# Patient Record
Sex: FEMALE | Race: WHITE | NOT HISPANIC OR LATINO | Employment: OTHER | URBAN - METROPOLITAN AREA
[De-identification: names, ages, dates, MRNs, and addresses within clinical notes are randomized per-mention and may not be internally consistent; named-entity substitution may affect disease eponyms.]

---

## 2018-01-14 ENCOUNTER — HOSPITAL ENCOUNTER (INPATIENT)
Facility: HOSPITAL | Age: 83
LOS: 4 days | Discharge: NURSING, REHAB OR LONG-TERM CARE | DRG: 470 | End: 2018-01-18
Attending: EMERGENCY MEDICINE | Admitting: FAMILY MEDICINE
Payer: MEDICARE

## 2018-01-14 ENCOUNTER — APPOINTMENT (EMERGENCY)
Dept: RADIOLOGY | Facility: HOSPITAL | Age: 83
DRG: 470 | End: 2018-01-14
Payer: MEDICARE

## 2018-01-14 ENCOUNTER — APPOINTMENT (EMERGENCY)
Dept: CT IMAGING | Facility: HOSPITAL | Age: 83
DRG: 470 | End: 2018-01-14
Payer: MEDICARE

## 2018-01-14 DIAGNOSIS — S51.001A AVULSION OF SKIN OF RIGHT ELBOW, INITIAL ENCOUNTER: ICD-10-CM
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
## 2018-01-14 DIAGNOSIS — S72.001A CLOSED DISPLACED FRACTURE OF RIGHT FEMORAL NECK (HCC): ICD-10-CM

## 2018-01-14 PROBLEM — R26.2 AMBULATORY DYSFUNCTION: Chronic | Status: ACTIVE | Noted: 2018-01-14

## 2018-01-14 LAB
ABO GROUP BLD: NORMAL
ANION GAP SERPL CALCULATED.3IONS-SCNC: 10 MMOL/L (ref 4–13)
BASOPHILS # BLD AUTO: 0.02 THOUSANDS/ΜL (ref 0–0.1)
BASOPHILS NFR BLD AUTO: 0 % (ref 0–1)
BLD GP AB SCN SERPL QL: NEGATIVE
BUN SERPL-MCNC: 20 MG/DL (ref 5–25)
CALCIUM SERPL-MCNC: 8.8 MG/DL (ref 8.3–10.1)
CHLORIDE SERPL-SCNC: 100 MMOL/L (ref 100–108)
CO2 SERPL-SCNC: 27 MMOL/L (ref 21–32)
CREAT SERPL-MCNC: 1.01 MG/DL (ref 0.6–1.3)
EOSINOPHIL # BLD AUTO: 0.16 THOUSAND/ΜL (ref 0–0.61)
EOSINOPHIL NFR BLD AUTO: 1 % (ref 0–6)
ERYTHROCYTE [DISTWIDTH] IN BLOOD BY AUTOMATED COUNT: 15.4 % (ref 11.6–15.1)
GFR SERPL CREATININE-BSD FRML MDRD: 47 ML/MIN/1.73SQ M
GLUCOSE SERPL-MCNC: 96 MG/DL (ref 65–140)
HCT VFR BLD AUTO: 36 % (ref 34.8–46.1)
HGB BLD-MCNC: 11.3 G/DL (ref 11.5–15.4)
INR PPP: 1.04 (ref 0.86–1.16)
LYMPHOCYTES # BLD AUTO: 1.51 THOUSANDS/ΜL (ref 0.6–4.47)
LYMPHOCYTES NFR BLD AUTO: 8 % (ref 14–44)
MCH RBC QN AUTO: 25.9 PG (ref 26.8–34.3)
MCHC RBC AUTO-ENTMCNC: 31.4 G/DL (ref 31.4–37.4)
MCV RBC AUTO: 82 FL (ref 82–98)
MONOCYTES # BLD AUTO: 2.02 THOUSAND/ΜL (ref 0.17–1.22)
MONOCYTES NFR BLD AUTO: 11 % (ref 4–12)
NEUTROPHILS # BLD AUTO: 14.96 THOUSANDS/ΜL (ref 1.85–7.62)
NEUTS SEG NFR BLD AUTO: 80 % (ref 43–75)
PLATELET # BLD AUTO: 381 THOUSANDS/UL (ref 149–390)
PMV BLD AUTO: 10 FL (ref 8.9–12.7)
POTASSIUM SERPL-SCNC: 4.1 MMOL/L (ref 3.5–5.3)
PROTHROMBIN TIME: 13.9 SECONDS (ref 12.1–14.4)
RBC # BLD AUTO: 4.37 MILLION/UL (ref 3.81–5.12)
RH BLD: POSITIVE
SODIUM SERPL-SCNC: 137 MMOL/L (ref 136–145)
SPECIMEN EXPIRATION DATE: NORMAL
WBC # BLD AUTO: 18.67 THOUSAND/UL (ref 4.31–10.16)

## 2018-01-14 PROCEDURE — 86900 BLOOD TYPING SEROLOGIC ABO: CPT | Performed by: EMERGENCY MEDICINE

## 2018-01-14 PROCEDURE — 87081 CULTURE SCREEN ONLY: CPT | Performed by: PHYSICIAN ASSISTANT

## 2018-01-14 PROCEDURE — 80048 BASIC METABOLIC PNL TOTAL CA: CPT | Performed by: EMERGENCY MEDICINE

## 2018-01-14 PROCEDURE — 86901 BLOOD TYPING SEROLOGIC RH(D): CPT | Performed by: EMERGENCY MEDICINE

## 2018-01-14 PROCEDURE — 36415 COLL VENOUS BLD VENIPUNCTURE: CPT | Performed by: EMERGENCY MEDICINE

## 2018-01-14 PROCEDURE — 96374 THER/PROPH/DIAG INJ IV PUSH: CPT

## 2018-01-14 PROCEDURE — 86850 RBC ANTIBODY SCREEN: CPT | Performed by: EMERGENCY MEDICINE

## 2018-01-14 PROCEDURE — 86920 COMPATIBILITY TEST SPIN: CPT

## 2018-01-14 PROCEDURE — 70450 CT HEAD/BRAIN W/O DYE: CPT

## 2018-01-14 PROCEDURE — 93005 ELECTROCARDIOGRAM TRACING: CPT

## 2018-01-14 PROCEDURE — 85610 PROTHROMBIN TIME: CPT | Performed by: PHYSICIAN ASSISTANT

## 2018-01-14 PROCEDURE — 71045 X-RAY EXAM CHEST 1 VIEW: CPT

## 2018-01-14 PROCEDURE — 73502 X-RAY EXAM HIP UNI 2-3 VIEWS: CPT

## 2018-01-14 PROCEDURE — 93005 ELECTROCARDIOGRAM TRACING: CPT | Performed by: EMERGENCY MEDICINE

## 2018-01-14 PROCEDURE — 85025 COMPLETE CBC W/AUTO DIFF WBC: CPT | Performed by: EMERGENCY MEDICINE

## 2018-01-14 PROCEDURE — 72125 CT NECK SPINE W/O DYE: CPT

## 2018-01-14 RX ORDER — CITALOPRAM 20 MG/1
20 TABLET ORAL DAILY
COMMUNITY

## 2018-01-14 RX ORDER — LIDOCAINE 50 MG/G
1 PATCH TOPICAL DAILY
Status: COMPLETED | OUTPATIENT
Start: 2018-01-15 | End: 2018-01-15

## 2018-01-14 RX ORDER — POLYVINYL ALCOHOL 14 MG/ML
1 SOLUTION/ DROPS OPHTHALMIC 3 TIMES DAILY
COMMUNITY

## 2018-01-14 RX ORDER — GINSENG 100 MG
1 CAPSULE ORAL ONCE
Status: COMPLETED | OUTPATIENT
Start: 2018-01-14 | End: 2018-01-14

## 2018-01-14 RX ORDER — LANOLIN ALCOHOL/MO/W.PET/CERES
6 CREAM (GRAM) TOPICAL
Status: DISCONTINUED | OUTPATIENT
Start: 2018-01-14 | End: 2018-01-17

## 2018-01-14 RX ORDER — SUCRALFATE 1 G/1
1 TABLET ORAL 4 TIMES DAILY
Status: DISCONTINUED | OUTPATIENT
Start: 2018-01-14 | End: 2018-01-18 | Stop reason: HOSPADM

## 2018-01-14 RX ORDER — ONDANSETRON 2 MG/ML
4 INJECTION INTRAMUSCULAR; INTRAVENOUS EVERY 6 HOURS PRN
Status: DISCONTINUED | OUTPATIENT
Start: 2018-01-14 | End: 2018-01-18 | Stop reason: HOSPADM

## 2018-01-14 RX ORDER — SUCRALFATE 1 G/1
1 TABLET ORAL 4 TIMES DAILY
COMMUNITY

## 2018-01-14 RX ORDER — OXYCODONE HYDROCHLORIDE 5 MG/1
5 TABLET ORAL EVERY 4 HOURS PRN
Status: DISCONTINUED | OUTPATIENT
Start: 2018-01-14 | End: 2018-01-17

## 2018-01-14 RX ORDER — POLYVINYL ALCOHOL 14 MG/ML
1 SOLUTION/ DROPS OPHTHALMIC 3 TIMES DAILY
Status: DISCONTINUED | OUTPATIENT
Start: 2018-01-14 | End: 2018-01-18 | Stop reason: HOSPADM

## 2018-01-14 RX ORDER — CITALOPRAM 20 MG/1
20 TABLET ORAL DAILY
Status: DISCONTINUED | OUTPATIENT
Start: 2018-01-15 | End: 2018-01-18 | Stop reason: HOSPADM

## 2018-01-14 RX ORDER — MORPHINE SULFATE 4 MG/ML
4 INJECTION, SOLUTION INTRAMUSCULAR; INTRAVENOUS ONCE
Status: COMPLETED | OUTPATIENT
Start: 2018-01-14 | End: 2018-01-14

## 2018-01-14 RX ORDER — MAGNESIUM HYDROXIDE/ALUMINUM HYDROXICE/SIMETHICONE 120; 1200; 1200 MG/30ML; MG/30ML; MG/30ML
30 SUSPENSION ORAL EVERY 6 HOURS PRN
Status: DISCONTINUED | OUTPATIENT
Start: 2018-01-14 | End: 2018-01-18 | Stop reason: HOSPADM

## 2018-01-14 RX ORDER — GABAPENTIN 100 MG/1
100 CAPSULE ORAL
Status: DISCONTINUED | OUTPATIENT
Start: 2018-01-14 | End: 2018-01-18 | Stop reason: HOSPADM

## 2018-01-14 RX ORDER — SODIUM CHLORIDE 9 MG/ML
100 INJECTION, SOLUTION INTRAVENOUS CONTINUOUS
Status: DISCONTINUED | OUTPATIENT
Start: 2018-01-14 | End: 2018-01-17

## 2018-01-14 RX ORDER — SODIUM CHLORIDE 1000 MG
1 TABLET, SOLUBLE MISCELLANEOUS 2 TIMES DAILY
COMMUNITY
End: 2018-01-18 | Stop reason: HOSPADM

## 2018-01-14 RX ORDER — MORPHINE SULFATE 10 MG/ML
6 INJECTION, SOLUTION INTRAMUSCULAR; INTRAVENOUS ONCE
Status: COMPLETED | OUTPATIENT
Start: 2018-01-14 | End: 2018-01-14

## 2018-01-14 RX ORDER — MINERAL OIL 100 G/100G
1 OIL RECTAL
COMMUNITY

## 2018-01-14 RX ORDER — ACETAMINOPHEN 325 MG/1
975 TABLET ORAL EVERY 8 HOURS SCHEDULED
Status: DISCONTINUED | OUTPATIENT
Start: 2018-01-14 | End: 2018-01-18 | Stop reason: HOSPADM

## 2018-01-14 RX ADMIN — SODIUM CHLORIDE 50 ML/HR: 0.9 INJECTION, SOLUTION INTRAVENOUS at 22:45

## 2018-01-14 RX ADMIN — ACETAMINOPHEN 975 MG: 325 TABLET, FILM COATED ORAL at 23:17

## 2018-01-14 RX ADMIN — POLYVINYL ALCOHOL 1 DROP: 14 SOLUTION/ DROPS OPHTHALMIC at 23:20

## 2018-01-14 RX ADMIN — BACITRACIN ZINC 1 SMALL APPLICATION: 500 OINTMENT TOPICAL at 22:22

## 2018-01-14 RX ADMIN — SUCRALFATE 1 G: 1 TABLET ORAL at 23:17

## 2018-01-14 RX ADMIN — OXYCODONE HYDROCHLORIDE 5 MG: 5 TABLET ORAL at 23:17

## 2018-01-14 RX ADMIN — MORPHINE SULFATE 4 MG: 4 INJECTION INTRAVENOUS at 19:46

## 2018-01-14 RX ADMIN — MORPHINE SULFATE 6 MG: 10 INJECTION, SOLUTION INTRAMUSCULAR; INTRAVENOUS at 21:04

## 2018-01-14 RX ADMIN — ONDANSETRON 4 MG: 2 INJECTION INTRAMUSCULAR; INTRAVENOUS at 22:47

## 2018-01-14 RX ADMIN — GABAPENTIN 100 MG: 100 CAPSULE ORAL at 23:17

## 2018-01-14 RX ADMIN — MELATONIN 6 MG: 3 TAB ORAL at 23:17

## 2018-01-15 PROBLEM — S72.001A CLOSED DISPLACED FRACTURE OF RIGHT FEMORAL NECK (HCC): Status: ACTIVE | Noted: 2018-01-14

## 2018-01-15 LAB
ALBUMIN SERPL BCP-MCNC: 3.4 G/DL (ref 3.5–5)
ALP SERPL-CCNC: 72 U/L (ref 46–116)
ALT SERPL W P-5'-P-CCNC: 25 U/L (ref 12–78)
ANION GAP SERPL CALCULATED.3IONS-SCNC: 7 MMOL/L (ref 4–13)
AST SERPL W P-5'-P-CCNC: 20 U/L (ref 5–45)
ATRIAL RATE: 103 BPM
BILIRUB SERPL-MCNC: 0.8 MG/DL (ref 0.2–1)
BUN SERPL-MCNC: 18 MG/DL (ref 5–25)
CALCIUM SERPL-MCNC: 8.2 MG/DL (ref 8.3–10.1)
CHLORIDE SERPL-SCNC: 102 MMOL/L (ref 100–108)
CLARITY, POC: NORMAL
CO2 SERPL-SCNC: 27 MMOL/L (ref 21–32)
COLOR, POC: YELLOW
CREAT SERPL-MCNC: 1.01 MG/DL (ref 0.6–1.3)
ERYTHROCYTE [DISTWIDTH] IN BLOOD BY AUTOMATED COUNT: 15.3 % (ref 11.6–15.1)
EXT BILIRUBIN, UA: NEGATIVE
EXT BLOOD URINE: NEGATIVE
EXT GLUCOSE, UA: NEGATIVE
EXT KETONES: NEGATIVE
EXT NITRITE, UA: NEGATIVE
EXT PH, UA: 6
EXT PROTEIN, UA: NORMAL
EXT SPECIFIC GRAVITY, UA: 1.02
EXT UROBILINOGEN: 0.2
GFR SERPL CREATININE-BSD FRML MDRD: 47 ML/MIN/1.73SQ M
GLUCOSE SERPL-MCNC: 124 MG/DL (ref 65–140)
HCT VFR BLD AUTO: 33.4 % (ref 34.8–46.1)
HGB BLD-MCNC: 10.1 G/DL (ref 11.5–15.4)
INR PPP: 1.14 (ref 0.86–1.16)
MAGNESIUM SERPL-MCNC: 1.9 MG/DL (ref 1.6–2.6)
MCH RBC QN AUTO: 25.1 PG (ref 26.8–34.3)
MCHC RBC AUTO-ENTMCNC: 30.2 G/DL (ref 31.4–37.4)
MCV RBC AUTO: 83 FL (ref 82–98)
P AXIS: 55 DEGREES
PLATELET # BLD AUTO: 309 THOUSANDS/UL (ref 149–390)
PMV BLD AUTO: 9.4 FL (ref 8.9–12.7)
POTASSIUM SERPL-SCNC: 4.1 MMOL/L (ref 3.5–5.3)
PR INTERVAL: 150 MS
PROT SERPL-MCNC: 6.6 G/DL (ref 6.4–8.2)
PROTHROMBIN TIME: 15 SECONDS (ref 12.1–14.4)
QRS AXIS: 39 DEGREES
QRSD INTERVAL: 72 MS
QT INTERVAL: 314 MS
QTC INTERVAL: 403 MS
RBC # BLD AUTO: 4.02 MILLION/UL (ref 3.81–5.12)
SODIUM SERPL-SCNC: 136 MMOL/L (ref 136–145)
T WAVE AXIS: 39 DEGREES
VENTRICULAR RATE: 99 BPM
WBC # BLD AUTO: 11.36 THOUSAND/UL (ref 4.31–10.16)
WBC # BLD EST: NORMAL 10*3/UL

## 2018-01-15 PROCEDURE — 99285 EMERGENCY DEPT VISIT HI MDM: CPT

## 2018-01-15 PROCEDURE — 85027 COMPLETE CBC AUTOMATED: CPT | Performed by: PHYSICIAN ASSISTANT

## 2018-01-15 PROCEDURE — 81002 URINALYSIS NONAUTO W/O SCOPE: CPT | Performed by: PHYSICIAN ASSISTANT

## 2018-01-15 PROCEDURE — 85610 PROTHROMBIN TIME: CPT | Performed by: PHYSICIAN ASSISTANT

## 2018-01-15 PROCEDURE — 36415 COLL VENOUS BLD VENIPUNCTURE: CPT | Performed by: PHYSICIAN ASSISTANT

## 2018-01-15 PROCEDURE — 83735 ASSAY OF MAGNESIUM: CPT | Performed by: PHYSICIAN ASSISTANT

## 2018-01-15 PROCEDURE — 80053 COMPREHEN METABOLIC PANEL: CPT | Performed by: PHYSICIAN ASSISTANT

## 2018-01-15 RX ORDER — POLYETHYLENE GLYCOL 3350 17 G/17G
17 POWDER, FOR SOLUTION ORAL DAILY
Status: DISCONTINUED | OUTPATIENT
Start: 2018-01-15 | End: 2018-01-18 | Stop reason: HOSPADM

## 2018-01-15 RX ORDER — CITALOPRAM 20 MG/1
20 TABLET ORAL DAILY
Status: DISCONTINUED | OUTPATIENT
Start: 2018-01-15 | End: 2018-01-15 | Stop reason: SDUPTHER

## 2018-01-15 RX ORDER — PANTOPRAZOLE SODIUM 40 MG/1
40 TABLET, DELAYED RELEASE ORAL
Status: DISCONTINUED | OUTPATIENT
Start: 2018-01-15 | End: 2018-01-18 | Stop reason: HOSPADM

## 2018-01-15 RX ORDER — LEVOTHYROXINE SODIUM 0.03 MG/1
25 TABLET ORAL
Status: DISCONTINUED | OUTPATIENT
Start: 2018-01-15 | End: 2018-01-18 | Stop reason: HOSPADM

## 2018-01-15 RX ORDER — MORPHINE SULFATE 4 MG/ML
4 INJECTION, SOLUTION INTRAMUSCULAR; INTRAVENOUS EVERY 4 HOURS PRN
Status: DISCONTINUED | OUTPATIENT
Start: 2018-01-15 | End: 2018-01-17

## 2018-01-15 RX ORDER — AMOXICILLIN 250 MG
1 CAPSULE ORAL DAILY
Status: DISCONTINUED | OUTPATIENT
Start: 2018-01-15 | End: 2018-01-18 | Stop reason: HOSPADM

## 2018-01-15 RX ORDER — TRAMADOL HYDROCHLORIDE 50 MG/1
50 TABLET ORAL EVERY 6 HOURS PRN
Status: DISCONTINUED | OUTPATIENT
Start: 2018-01-15 | End: 2018-01-18 | Stop reason: HOSPADM

## 2018-01-15 RX ORDER — DICYCLOMINE HCL 20 MG
20 TABLET ORAL 2 TIMES DAILY PRN
Status: DISCONTINUED | OUTPATIENT
Start: 2018-01-15 | End: 2018-01-18 | Stop reason: HOSPADM

## 2018-01-15 RX ADMIN — SUCRALFATE 1 G: 1 TABLET ORAL at 08:57

## 2018-01-15 RX ADMIN — LEVOTHYROXINE SODIUM 25 MCG: 25 TABLET ORAL at 06:47

## 2018-01-15 RX ADMIN — POLYVINYL ALCOHOL 1 DROP: 14 SOLUTION/ DROPS OPHTHALMIC at 21:20

## 2018-01-15 RX ADMIN — ACETAMINOPHEN 975 MG: 325 TABLET, FILM COATED ORAL at 21:19

## 2018-01-15 RX ADMIN — MELATONIN 6 MG: 3 TAB ORAL at 21:20

## 2018-01-15 RX ADMIN — SUCRALFATE 1 G: 1 TABLET ORAL at 21:20

## 2018-01-15 RX ADMIN — PANTOPRAZOLE SODIUM 40 MG: 40 TABLET, DELAYED RELEASE ORAL at 06:47

## 2018-01-15 RX ADMIN — OXYCODONE HYDROCHLORIDE 5 MG: 5 TABLET ORAL at 23:16

## 2018-01-15 RX ADMIN — ACETAMINOPHEN 975 MG: 325 TABLET, FILM COATED ORAL at 16:12

## 2018-01-15 RX ADMIN — POLYVINYL ALCOHOL 1 DROP: 14 SOLUTION/ DROPS OPHTHALMIC at 08:57

## 2018-01-15 RX ADMIN — SUCRALFATE 1 G: 1 TABLET ORAL at 18:03

## 2018-01-15 RX ADMIN — POLYVINYL ALCOHOL 1 DROP: 14 SOLUTION/ DROPS OPHTHALMIC at 16:13

## 2018-01-15 RX ADMIN — CITALOPRAM HYDROBROMIDE 20 MG: 20 TABLET ORAL at 10:01

## 2018-01-15 RX ADMIN — LIDOCAINE 1 PATCH: 50 PATCH TOPICAL at 08:57

## 2018-01-15 RX ADMIN — GABAPENTIN 100 MG: 100 CAPSULE ORAL at 21:20

## 2018-01-15 RX ADMIN — MORPHINE SULFATE 4 MG: 4 INJECTION INTRAVENOUS at 15:58

## 2018-01-15 RX ADMIN — HYDROMORPHONE HYDROCHLORIDE 0.2 MG: 1 INJECTION, SOLUTION INTRAMUSCULAR; INTRAVENOUS; SUBCUTANEOUS at 13:00

## 2018-01-15 RX ADMIN — TRAMADOL HYDROCHLORIDE 50 MG: 50 TABLET, FILM COATED ORAL at 19:33

## 2018-01-15 RX ADMIN — SODIUM CHLORIDE 50 ML/HR: 0.9 INJECTION, SOLUTION INTRAVENOUS at 16:59

## 2018-01-15 RX ADMIN — SUCRALFATE 1 G: 1 TABLET ORAL at 12:54

## 2018-01-15 NOTE — CONSULTS
[address] [place] [address] 80 y o  female MRN: [id number]  Unit/Bed#: -01      Chief Complaint:   Right Hip Pain    HPI:   80 y  o female seen on consultation by orthopedics requested by Torrey Middleton PA-C for right hip pain  Pt has a history of dementia and is a poor historian  Her son Justus Stoner is present at bedside and provides history  He states patient sustained mechanical fall last night while walking to the bathroom  She fell onto her right side  At baseline she walks with a walker but does not use it regularly  She reported to the emergency department where x-rays revealed a right hip fracture  Currently patient is comfortable in bed  Review Of Systems:   · Unable to obtain secondary to dementia    Past Medical History:   Past Medical History:   Diagnosis Date    Anxiety     Depression     GERD (gastroesophageal reflux disease)     Lumbago     Osteoporosis     Thrombocytopenia ([address] )        Past Surgical History:   No past surgical history on file  Family History:  Family history reviewed and non-contributory  No family history on file  Social History:  Social History     Social History    Marital status:       Spouse name: N/A    Number of children: N/A    Years of education: N/A     Social History Main Topics    Smoking status: None    Smokeless tobacco: None    Alcohol use None    Drug use: Unknown    Sexual activity: Not Asked     Other Topics Concern    None     Social History Narrative    None       Allergies:   No Known Allergies        Labs:    0  Lab Value Date/Time   HCT 33 4 (L) 01/15/2018 0510   HCT 36 0 01/14/2018 2042   HCT 35 1 11/25/2016 2006   HCT 31 1 (L) 09/17/2014 0219   HCT 35 8 09/16/2014 1210   HGB 10 1 (L) 01/15/2018 0510   HGB 11 3 (L) 01/14/2018 2042   HGB 11 2 (L) 11/25/2016 2006   HGB 10 0 (L) 09/17/2014 0219   HGB 11 7 09/16/2014 1210   INR 1 14 01/15/2018 0510   INR 1 25 (H) 09/16/2014 1210   WBC 11 36 (H) 01/15/2018 0510   WBC 18 67 (H) 01/14/2018 2042   WBC 7 66 11/25/2016 2006   WBC 4 76 09/17/2014 0219   WBC 5 73 09/16/2014 1210       Meds:    Current Facility-Administered Medications:     acetaminophen (TYLENOL) tablet 975 mg, 975 mg, Oral, Q8H [facility], Allyson MANLEY Matthew, PA-C, 975 mg at 01/14/18 2317    aluminum-magnesium hydroxide-simethicone (MYLANTA) 200-200-20 mg/5 mL oral suspension 30 mL, 30 mL, Oral, Q6H PRN, Allyson MANLEY Matthew, PA-C    citalopram (CeleXA) tablet 20 mg, 20 mg, Oral, Daily, Allyson L Matthew, PA-C, 20 mg at 01/15/18 1001    dicyclomine (BENTYL) tablet 20 mg, 20 mg, Oral, BID PRN, Allyson MANLEY Matthew, PA-C    enoxaparin (LOVENOX) subcutaneous injection 40 mg, 40 mg, Subcutaneous, Daily, Allyson Castro, PA-C    gabapentin (NEURONTIN) capsule 100 mg, 100 mg, Oral, HS, Allyson L Matthew, PA-C, 100 mg at 01/14/18 2317    HYDROmorphone (DILAUDID) injection 0 2 mg, 0 2 mg, Intravenous, Q6H PRN, Allyson L Matthew, PA-C, 0 2 mg at 01/15/18 1300    levothyroxine tablet 25 mcg, 25 mcg, Oral, Early Morning, Allyson L Matthew, PA-C, 25 mcg at 01/15/18 0647    lidocaine (LIDODERM) 5 % patch 1 patch, 1 patch, Transdermal, Daily, Allyson MANLEY Matthew, PA-C, 1 patch at 01/15/18 0857    melatonin tablet 6 mg, 6 mg, Oral, HS, Allyson L Matthew, PA-C, 6 mg at 01/14/18 2317    morphine (PF) 4 mg/mL injection 4 mg, 4 mg, Intravenous, Q4H PRN, Sarah Wilder MD, 4 mg at 01/15/18 1558    ondansetron (ZOFRAN) injection 4 mg, 4 mg, Intravenous, Q6H PRN, Allyson L Matthew, PA-C, 4 mg at 01/14/18 2247    oxyCODONE (ROXICODONE) IR tablet 5 mg, 5 mg, Oral, Q4H PRN, ABIOLA Steiner-IRWIN, 5 mg at 01/14/18 2317    pantoprazole (PROTONIX) EC tablet 40 mg, 40 mg, Oral, Daily Before Breakfast, Allyson Castro PA-C, 40 mg at 01/15/18 3028    polyethylene glycol (MIRALAX) packet 17 g, 17 g, Oral, Daily, Allyson Castro PA-C    polyvinyl alcohol (LIQUIFILM TEARS) 1 4 % ophthalmic solution 1 drop, 1 drop, Both Eyes, TID, Allyson Castro PA-C, 1 drop at 01/15/18 1857    senna-docusate sodium (SENOKOT S) 8 6-50 mg per tablet 1 tablet, 1 tablet, Oral, Daily, Allyson Castro PA-C    sodium chloride 0 9 % infusion, 50 mL/hr, Intravenous, Continuous, Allyson Castro PA-C, Last Rate: 50 mL/hr at 01/14/18 2245, 50 mL/hr at 01/14/18 2245    sucralfate (CARAFATE) tablet 1 g, 1 g, Oral, 4x Daily, Allyson Castro PA-C, 1 g at 01/15/18 1254    traMADol (ULTRAM) tablet 50 mg, 50 mg, Oral, Q6H PRN, Maya Montgomery PA-C    Physical Exam:   /61   Pulse 95   Temp 97 9 °F (36 6 °C) (Oral)   Resp 16   Ht 5' 5" (1 651 m)   Wt 58 kg (127 lb 13 9 oz)   SpO2 92%   BMI 21 28 kg/m²   Gen: Comfortable in bed in NAD  HEENT: EOMI, eyes clear, moist mucus membranes, hearing intact  Respiratory: Bilateral chest rise  No audible wheezing found  Cardiovascular: Regular Rate and Rhythm  Abdomen: soft nontender/nondistended  Musculoskeletal: Right Hip  · Skin intact  No erythema, ecchymosis, or swelling  · No significant deformity noted about RLE  · TTP greater troch  · Positive log roll test   · Motor/sensation intact RLE  · Palpable DP Pulse    Radiology:   I personally reviewed the films  X-rays right hip reveals a displaced femoral neck fracture with likely associated inferior pubic rami fracture    _*_*_*_*_*_*_*_*_*_*_*_*_*_*_*_*_*_*_*_*_*_*_*_*_*_*_*_*_*_*_*_*_*_*_*_*_*_*_*_*_*    Assessment:  89 y  o female with right hip displaced femoral neck fracture  Plan:   · Discussed treatment options with the patients son including surgical and non-operative treatment  Reviewed the risks and benefits associated with surgery consisting of right hip hemiarthroplasty  They have opted for surgery  · Plan for surgery tomorrow  · NPO after midnight  · Pain Control  · Bedrest/NWB RLE  · Medically cleared for surgery      Sanjay Urban PA-C

## 2018-01-15 NOTE — ASSESSMENT & PLAN NOTE
· Status post mechanical fall alert oriented to person place and time denied syncopal/vasovagal symptomatology  · PT/OT likely need short-term rehabilitation at [facility][1]  · Continue pain control-Tylenol around the clock, aqua K pad, tramadol p r n  moderate pain, oxycodone 5 mg p r n  severe pain, gabapentin HS, Dilaudid 0 2 mg IV q 6 p r n  breakthrough

## 2018-01-15 NOTE — ED PROVIDER NOTES
History  Chief Complaint   Patient presents with    Fall     Patient presents via EMS s/p fall from standing at [facility]  Patient states she remembers falling and fell onto her R side, denies any dizziness precipitating fall  Now has R hip and leg pain, has skin tear to R elbow  States she did strike the R side of her head   Hip Pain     89 YR FEMALE AT LOCAL LONG TERM CARE FACILITY  WAS WALKIGN WITH WALKER AND FELL- ON R SIDE- PT REMEBERS FALL-- C/O R HIP PAIN -- PT WAS NOT ON GROUND FOR LONG WAS ASSISTED BY STAFF TO STRETCHER- PT DEENIS ANY MEDICAL SYMPOTMS THAT CAUSED FALL-- HAS NO OTHER COMPS        History provided by:  Patient   used: No        Prior to Admission Medications   Prescriptions Last Dose Informant Patient Reported? Taking?   acetaminophen (TYLENOL) 325 mg tablet   Yes No   Sig: Take 650 mg by mouth every 6 (six) hours as needed for mild pain   citalopram (CeleXA) 20 mg tablet   Yes No   Sig: Take 20 mg by mouth daily   dicyclomine (BENTYL) 20 mg tablet   No No   Sig: Take 1 tablet by mouth 2 (two) times a day as needed (Abdominal pain/cramping)   levothyroxine 25 mcg tablet   Yes No   Sig: Take 25 mcg by mouth daily   omeprazole (PriLOSEC) 40 MG capsule   Yes No   Sig: Take 40 mg by mouth daily   ondansetron (ZOFRAN-ODT) 4 mg disintegrating tablet   Yes No   Sig: Take 4 mg by mouth every 8 (eight) hours as needed for nausea or vomiting   polyethylene glycol (MIRALAX) 17 g packet   Yes No   Sig: Take 17 g by mouth daily   senna-docusate sodium (SENOKOT-S) 8 6-50 mg per tablet   Yes No   Sig: Take 1 tablet by mouth daily   traMADol (ULTRAM) 50 mg tablet   Yes No   Sig: Take 50 mg by mouth every 6 (six) hours as needed for moderate pain      Facility-Administered Medications: None       Past Medical History:   Diagnosis Date    Anxiety     Depression     Lumbago     Osteoporosis     Thrombocytopenia (HCC)        No past surgical history on file      No family history on file   I have reviewed and agree with the history as documented  Social History   Substance Use Topics    Smoking status: Not on file    Smokeless tobacco: Not on file    Alcohol use Not on file        Review of Systems   Constitutional: Negative  HENT: Negative  Eyes: Negative  Respiratory: Negative  Cardiovascular: Negative  Gastrointestinal: Negative  Endocrine: Negative  Genitourinary: Negative  Musculoskeletal: Negative  R HIP PAIN   Skin: Positive for wound  Negative for color change, pallor and rash  R POST ELBOW SKIN AVULSION   Allergic/Immunologic: Negative  Neurological: Negative  Hematological: Negative  Psychiatric/Behavioral: Negative  Physical Exam  ED Triage Vitals [01/14/18 1910]   Temperature Pulse Respirations Blood Pressure SpO2   97 7 °F (36 5 °C) 87 18 151/96 96 %      Temp Source Heart Rate Source Patient Position - Orthostatic VS BP Location FiO2 (%)   Oral Monitor Lying Left arm --      Pain Score       5           Orthostatic Vital Signs  Vitals:    01/14/18 1910   BP: 151/96   Pulse: 87   Patient Position - Orthostatic VS: Lying       Physical Exam   Constitutional: She is oriented to person, place, and time  No distress  AVSS-- PULSE OX 96 % ON RA- INTPRETATION IS NORMAL- NO INTERVENTION -    HENT:   Head: Normocephalic  Right Ear: External ear normal    Left Ear: External ear normal    Nose: Nose normal    Mouth/Throat: Oropharynx is clear and moist  No oropharyngeal exudate  R OCCIPITAL PARIETAL SCALP HEAMTOMA-- NT    Eyes: Conjunctivae and EOM are normal  Pupils are equal, round, and reactive to light  Right eye exhibits no discharge  Left eye exhibits no discharge  No scleral icterus  MM PINK   Neck: Normal range of motion  Neck supple  No JVD present  No tracheal deviation present  No thyromegaly present  NO PMT C/T/L/S SPINE   Cardiovascular: Normal rate, regular rhythm and intact distal pulses    Exam reveals no gallop and no friction rub  Murmur heard  Pulmonary/Chest: Effort normal and breath sounds normal  No stridor  No respiratory distress  She has no wheezes  She has no rales  She exhibits no tenderness  Abdominal: Soft  Bowel sounds are normal  She exhibits no distension and no mass  There is no tenderness  There is no rebound and no guarding  No hernia  Musculoskeletal: Normal range of motion  She exhibits tenderness  She exhibits no edema or deformity  RLE- POS LATERAL PROX HIP TENDERNESS / DECREASED ROM AT HIP-- NO OVERT DEFORMITY-- R POSTERIOR ELBOW- POS SUPERFICAL SKIN AVULSION - NT AT ELBOW--  NORMAL/EQUAL BILATERAL RADIAL/DP PULSES- NO BLE EDEMA/CLAF TENDDENRESS/ASSYM/ ERYTHEMA   Lymphadenopathy:     She has no cervical adenopathy  Neurological: She is alert and oriented to person, place, and time  No cranial nerve deficit or sensory deficit  She exhibits normal muscle tone  Coordination normal    Skin: Skin is warm  Capillary refill takes less than 2 seconds  No rash noted  She is not diaphoretic  No erythema  There is pallor  Psychiatric: She has a normal mood and affect  Her behavior is normal    Nursing note and vitals reviewed        ED Medications  Medications   morphine (PF) 4 mg/mL injection 4 mg (4 mg Intravenous Given 1/14/18 1946)       Diagnostic Studies  Results Reviewed     None                 CT head without contrast    (Results Pending)   CT cervical spine without contrast    (Results Pending)   XR hip/pelv 2-3 vws right    (Results Pending)              Procedures  Procedures       Phone Contacts  ED Phone Contact    ED Course  ED Course as of Jan 14 2027   Conor Zee Jan 14, 2018 2024 PELVIS XRAY/ R HIP XRAY - POS SUBCAPITAL FEMORAL NECK FRACTURE    2024 CXR PORTABLE- BORDERLINE CARDIOMEGALY- NO INFILTRATE/ PTX/ PULM EDEMA                                MDM  CritCare Time    Disposition  Final diagnoses:   None     ED Disposition     None      Follow-up Information    None Patient's Medications   Discharge Prescriptions    No medications on file     No discharge procedures on file      ED Provider  Electronically Signed by           Patrice Steen MD  01/14/18 2128       Patrice Steen MD  01/14/18 2131

## 2018-01-15 NOTE — ED NOTES
Vito Escobar from ortho office called, notified pt  Family upset that ortho has not seen pt  Vito Escobar aware pt  Needs to be seen, unsure when pt  Will be seen at this time        Karley Delgadillo RN  01/15/18 Piedad Rutherford RN  01/15/18 5905

## 2018-01-15 NOTE — ASSESSMENT & PLAN NOTE
· Likely secondary to mechanical fall with cranial involvement   · Obtain orthostatic BP, EKG  · Orthopedic surgery consult appreciate recommendations  NPO after midnight, strict bed rest continue maintenance fluids IVF normal saline at 75 mL/HR  · Continue pain control-Tylenol around the clock, aqua K pad, tramadol p r n  moderate pain, oxycodone 5 mg p r n  severe pain, gabapentin HS, Dilaudid 0 2 mg IV q 6 p r n  Breakthrough    Patient stable to be taken to surgery  This Patient has a cardiac risk index score of Ii   With mets <4 for an intermediate risk non cardiac surgical procedure

## 2018-01-15 NOTE — CASE MANAGEMENT
Initial Clinical Review    Admission: Date/Time/Statement: 1/14/18 @ 2030     Orders Placed This Encounter   Procedures    Inpatient Admission (expected length of stay for this patient is greater than two midnights)     Standing Status:   Standing     Number of Occurrences:   1     Order Specific Question:   Admitting Physician     Answer:   Donna Langston [[id number]]     Order Specific Question:   Level of Care     Answer:   Med Surg [[id number]]     Order Specific Question:   Estimated length of stay     Answer:   More than 2 Midnights     Order Specific Question:   Certification     Answer:   I certify that inpatient services are medically necessary for this patient for a duration of greater than two midnights  See H&P and MD Progress Notes for additional information about the patient's course of treatment  ED: Date/Time/Mode of Arrival:   ED Arrival Information     Expected Arrival Acuity Means of Arrival Escorted By Service Admission Type    - 1/14/2018 18:56 Urgent Ambulance [address] Urgent    Arrival Complaint    Fall          Chief Complaint:   Chief Complaint   Patient presents with   Tomie [employer]op Fall     Patient presents via EMS s/p fall from standing at [employer]  Patient states she remembers falling and fell onto her R side, denies any dizziness precipitating fall  Now has R hip and leg pain, has skin tear to R elbow  States she did strike the R side of her head   Hip Pain       History of Illness:  80 y o  female past medical history GERD, anxiety/depression who presents with right hip pain status post fall earlier today  Patient was alert oriented to person place however not time admitted to a fall at nursing facility [facility] earlier today    Patient stated while walking with grandson and rolling walker to friend's apartment patient may have tripped over rolling walker and proceeded to fall on right side with cranial involvement without loss of consciousness, loss of bowel or bladder, tremor-like activity, paresthesias, facial droop, slurred speech  Patient denied preceding chest pain, shortness of breath, lightheadedness or dizziness, headache or vision changes  Patient denied recent illnesses, sick contacts, abdominal pain, nausea vomiting or diarrhea  Patient grandson and nursing facility staff called EMS and transported patient from nursing facility to ED for further evaluation       ED Vital Signs:   ED Triage Vitals [01/14/18 1910]   Temperature Pulse Respirations Blood Pressure SpO2   97 7 °F (36 5 °C) 87 18 151/96 96 %      Temp Source Heart Rate Source Patient Position - Orthostatic VS BP Location FiO2 (%)   Oral Monitor Lying Left arm --      Pain Score       5        Wt Readings from Last 1 Encounters:   01/14/18 58 kg (127 lb 13 9 oz)       Vital Signs (abnormal):   Date and Time Temp Pulse SpO2 Resp BP   01/15/18 0629  97 4 °F (36 3 °C) 93 94 % 18 119/55   01/15/18 0230 -- 102 93 % -- --   01/15/18 0015 -- 104 95 % -- --   01/14/18 2300 98 °F (36 7 °C)  109 95 % 14 139/63   01/14/18 2217 --  107 97 % 16 137/65     Abnormal Labs/Diagnostic Test Results: WBC 18 67, Hgb 11 3, Neuros PCT 80    Right Hip Xray:  closed displaced fracture of right femoral neck     ED Treatment:   Medication Administration from 01/14/2018 1856 to 01/15/2018 0813       Date/Time Order Dose Route Action Action by Comments     01/14/2018 1946 morphine (PF) 4 mg/mL injection 4 mg 4 mg Intravenous Given Dennise Armstrong RN      01/14/2018 2104 morphine (PF) 10 mg/mL injection 6 mg 6 mg Intravenous Given Dennise Armstrong RN      01/15/2018 0801 polyethylene glycol (MIRALAX) packet 17 g 17 g Oral Not Given Josette Altamirano RN      01/15/2018 4194 levothyroxine tablet 25 mcg 25 mcg Oral Given Dennise Armstrong RN      01/15/2018 0801 senna-docusate sodium (SENOKOT S) 8 6-50 mg per tablet 1 tablet 1 tablet Oral Not Given Josette Altamirano RN      01/15/2018 0647 pantoprazole (PROTONIX) EC tablet 40 mg 40 mg Oral Given Evaristo Plata RN      01/14/2018 2245 sodium chloride 0 9 % infusion 50 mL/hr Intravenous [address] Evaristo Plata RN      01/14/2018 2247 ondansetron (ZOFRAN) injection 4 mg 4 mg Intravenous Given Evaristo Plata RN      01/15/2018 0801 enoxaparin (LOVENOX) subcutaneous injection 40 mg 40 mg Subcutaneous Not Given Zelalem Mensah RN Pt for OR     01/15/2018 0648 acetaminophen (TYLENOL) tablet 975 mg 975 mg Oral Not Given Evaristo Plata RN IV pain medication available for pt PRN     01/14/2018 2317 acetaminophen (TYLENOL) tablet 975 mg 975 mg Oral Given Evaristo Plata RN      01/14/2018 2317 gabapentin (NEURONTIN) capsule 100 mg 100 mg Oral Given Evaristo Plata RN      01/14/2018 2317 oxyCODONE (ROXICODONE) IR tablet 5 mg 5 mg Oral Given Evaristo Plata RN      01/14/2018 2222 bacitracin topical ointment 1 small application 1 small application Topical Given Shasha Montiel RN APPLIED TO R ELBOW SKIN TEAR     01/14/2018 2317 melatonin tablet 6 mg 6 mg Oral Given Evaristo Plata RN      01/14/2018 2320 polyvinyl alcohol (LIQUIFILM TEARS) 1 4 % ophthalmic solution 1 drop 1 drop Both Eyes Given Evaristo Plata RN      01/14/2018 2317 sucralfate (CARAFATE) tablet 1 g 1 g Oral Given Evaristo Plata RN         Physical Exam   Constitutional: She is oriented to person, place, and time  She appears well-developed  No distress  Frail, lying somewhat uncomfortable in bed in no acute distress   Dry mucous membranes   Cardiovascular: Normal rate  Murmur heard  Systolic murmur is present with a grade of 3/6   DP pulses present bilaterally, no bilateral lower extremity edema   Pulmonary/Chest: Effort normal and breath sounds normal  No respiratory distress  She has no wheezes  She has no rales  She exhibits no tenderness  Musculoskeletal: She exhibits tenderness  She exhibits no edema or deformity     Decreased range of motion of right lower extremity, tenderness to palpation of right lateral hip   Neurological: She is alert and oriented to person, place, and time  She displays normal reflexes  No cranial nerve deficit  Coordination abnormal     Past Medical/Surgical History: Active Ambulatory Problems     Diagnosis Date Noted    No Active Ambulatory Problems     Resolved Ambulatory Problems     Diagnosis Date Noted    No Resolved Ambulatory Problems     Past Medical History:   Diagnosis Date    Anxiety     Depression     GERD (gastroesophageal reflux disease)     Lumbago     Osteoporosis     Thrombocytopenia (HCC)        Admitting Diagnosis: Head injuries [S09 90XA]    Age/Sex: 80 y o  female    Assessment/Plan:     Ambulatory dysfunction   Assessment & Plan     · Status post mechanical fall alert oriented to person place and time denied syncopal/vasovagal symptomatology  ? PT/OT likely need short-term rehabilitation at [facility]  ? Continue pain control-Tylenol around the clock, aqua K pad, tramadol p r n  moderate pain, oxycodone 5 mg p r n  severe pain, gabapentin HS, Dilaudid 0 2 mg IV q 6 p r n  breakthrough       * Closed fracture of neck of right femur (HCC)   Assessment & Plan     · Likely secondary to mechanical fall with cranial involvement   ? Obtain orthostatic BP, EKG  ? Orthopedic surgery consult appreciate recommendations  NPO after midnight, strict bed rest continue maintenance fluids IVF normal saline at 75 mL/HR  ? Continue pain control-Tylenol around the clock, aqua K pad, tramadol p r n  moderate pain, oxycodone 5 mg p r n  severe pain, gabapentin HS, Dilaudid 0 2 mg IV q 6 p r n  breakthrough       GERD (gastroesophageal reflux disease)   Assessment & Plan     · Stable  ? Continue home medication-pantoprazole, Carafate, Bentyl       Anxiety and depression   Assessment & Plan     · Stable moods  ?  Continue home medication-Celexa             VTE Prophylaxis: Enoxaparin (Lovenox)  / sequential compression device   Code Status:  Full code-discussed the patient at the bedside  POLST: POLST information provided but not completed        Anticipated Length of Stay:  Patient will be admitted on an Inpatient basis with an anticipated length of stay of  at least 2 midnights     Justification for Hospital Stay:  Closed right femoral neck fracture with ambulatory dysfunction      Admission Orders:  Inpatient/MedSurg  Consult Ortho r/e closed displaced fracture of right femoral neck   Bilateral Sequential Compression Device  OT/PT Consult  NSS @ 50  Scheduled Meds:   acetaminophen 975 mg Oral Q8H [address]   citalopram 20 mg Oral Daily   enoxaparin 40 mg Subcutaneous Daily   gabapentin 100 mg Oral HS   levothyroxine 25 mcg Oral Early Morning   lidocaine 1 patch Transdermal Daily   melatonin 6 mg Oral HS   pantoprazole 40 mg Oral Daily Before Breakfast   polyethylene glycol 17 g Oral Daily   polyvinyl alcohol 1 drop Both Eyes TID   senna-docusate sodium 1 tablet Oral Daily   sucralfate 1 g Oral 4x Daily     IV Zofran 4 mg x 1 thus far  Percocet po x 1 thus far

## 2018-01-15 NOTE — PROGRESS NOTES
Progress Note - Carlos Beth 7/14/1922, 80 y o  female MRN: [id number]    Unit/Bed#: ED 16 Encounter: [id number]    Primary Care Provider: No primary care provider on file  Date and time admitted to hospital: 1/14/2018  6:57 PM        * Closed fracture of neck of right femur ([address] )   Assessment & Plan    · Likely secondary to mechanical fall with cranial involvement   · Obtain orthostatic BP, EKG  · Orthopedic surgery consult appreciate recommendations  NPO after midnight, strict bed rest continue maintenance fluids IVF normal saline at 75 mL/HR  · Continue pain control-Tylenol around the clock, aqua K pad, tramadol p r n  moderate pain, oxycodone 5 mg p r n  severe pain, gabapentin HS, Dilaudid 0 2 mg IV q 6 p r n  Breakthrough    Patient stable to be taken to surgery  This Patient has a cardiac risk index score of Ii  With mets <4 for an intermediate risk non cardiac surgical procedure         Ambulatory dysfunction   Assessment & Plan    · Status post mechanical fall alert oriented to person place and time denied syncopal/vasovagal symptomatology  · PT/OT likely need short-term rehabilitation at [address] [place] [address]  · Continue pain control-Tylenol around the clock, aqua K pad, tramadol p r n  moderate pain, oxycodone 5 mg p r n  severe pain, gabapentin HS, Dilaudid 0 2 mg IV q 6 p r n  breakthrough        GERD (gastroesophageal reflux disease)   Assessment & Plan    · Stable  · Continue home medication-pantoprazole, Carafate, Bentyl        Anxiety and depression   Assessment & Plan    · Stable moods  · Continue home medication-Celexa          VTE Pharmacologic Prophylaxis:   Pharmacologic: Heparin  Mechanical VTE Prophylaxis in Place: Yes    Patient Centered Rounds: I have performed bedside rounds with nursing staff today  Discussions with Specialists or Other Care Team Provider:     Education and Discussions with Family / Patient:  Patient  Time Spent for Care: 20 minutes    More than 50% of total time spent on counseling and coordination of care as described above  Current Length of Stay: 1 day(s)    Current Patient Status: Inpatient   Certification Statement: The patient will continue to require additional inpatient hospital stay due to Acute above conditions    Discharge Plan:  Depends on clinical course  Code Status: Level 1 - Full Code      Subjective:   Patient was evaluated back sign follow without acute distress, oriented only to person without any complaints    Objective:     Vitals:   Temp (24hrs), Av 7 °F (36 5 °C), Min:97 4 °F (36 3 °C), Max:98 °F (36 7 °C)    HR:  [] 93  Resp:  [14-20] 18  BP: (119-151)/(55-96) 119/55  SpO2:  [93 %-97 %] 93 %  Body mass index is 21 28 kg/m²  Input and Output Summary (last 24 hours): Intake/Output Summary (Last 24 hours) at 01/15/18 0919  Last data filed at 18 2154   Gross per 24 hour   Intake                0 ml   Output              100 ml   Net             -100 ml       Physical Exam:     Physical Exam   Constitutional: No distress  Neck: No JVD present  No tracheal deviation present  No thyromegaly present  Cardiovascular: Normal rate  Exam reveals no gallop and no friction rub  No murmur heard  Pulmonary/Chest: No respiratory distress  She has no wheezes  She has no rales  She exhibits no tenderness  Abdominal: She exhibits no distension and no mass  There is no tenderness  There is no rebound and no guarding  Musculoskeletal: She exhibits tenderness and deformity  Lymphadenopathy:     She has no cervical adenopathy  Neurological: She is alert  Oriented only to person   Skin: Skin is warm  She is not diaphoretic  Psychiatric: She has a normal mood and affect         Additional Data:     Labs:      Results from last 7 days  Lab Units 01/15/18  0510 18  2042   WBC Thousand/uL 11 36* 18 67*   HEMOGLOBIN g/dL 10 1* 11 3*   HEMATOCRIT % 33 4* 36 0   PLATELETS Thousands/uL 309 381   NEUTROS PCT %  --  80*   LYMPHS PCT % --  8*   MONOS PCT %  --  11   EOS PCT %  --  1       Results from last 7 days  Lab Units 01/15/18  0510   SODIUM mmol/L 136   POTASSIUM mmol/L 4 1   CHLORIDE mmol/L 102   CO2 mmol/L 27   BUN mg/dL 18   CREATININE mg/dL 1 01   CALCIUM mg/dL 8 2*   TOTAL PROTEIN g/dL 6 6   BILIRUBIN TOTAL mg/dL 0 80   ALK PHOS U/L 72   ALT U/L 25   AST U/L 20   GLUCOSE RANDOM mg/dL 124       Results from last 7 days  Lab Units 01/15/18  0510   INR  1 14       * I Have Reviewed All Lab Data Listed Above  * Additional Pertinent Lab Tests Reviewed: All Labs Within Last 24 Hours Reviewed    Imaging:    Imaging Reports Reviewed Today Include:  Imaging Personally Reviewed by Myself Includes:      Recent Cultures (last 7 days):           Last 24 Hours Medication List:     acetaminophen 975 mg Oral Q8H [facility]   citalopram 20 mg Oral Daily   enoxaparin 40 mg Subcutaneous Daily   gabapentin 100 mg Oral HS   levothyroxine 25 mcg Oral Early Morning   lidocaine 1 patch Transdermal Daily   melatonin 6 mg Oral HS   pantoprazole 40 mg Oral Daily Before Breakfast   polyethylene glycol 17 g Oral Daily   polyvinyl alcohol 1 drop Both Eyes TID   senna-docusate sodium 1 tablet Oral Daily   sucralfate 1 g Oral 4x Daily        Today, Patient Was Seen By: Robyn Bedolla MD    ** Please Note: Dragon 360 Dictation voice to text software may have been used in the creation of this document   **

## 2018-01-15 NOTE — PLAN OF CARE
SAFETY ADULT     Maintain or return mobility status to optimal level Not Progressing          DISCHARGE PLANNING     Discharge to home or other facility with appropriate resources Progressing        INFECTION - ADULT     Absence or prevention of progression during hospitalization Progressing        Knowledge Deficit     Patient/family/caregiver demonstrates understanding of disease process, treatment plan, medications, and discharge instructions Progressing        PAIN - ADULT     Verbalizes/displays adequate comfort level or baseline comfort level Progressing        Potential for Falls     Patient will remain free of falls Progressing        Prexisting or High Potential for Compromised Skin Integrity     Skin integrity is maintained or improved Progressing        SAFETY ADULT     Maintain or return to baseline ADL function Progressing

## 2018-01-15 NOTE — ED PROCEDURE NOTE
PROCEDURE  ECG 12 Lead Documentation  Date/Time: 1/14/2018 9:28 PM  Performed by: Joseph Campos  Authorized by: Joseph Campos     Indications / Diagnosis:  ADMIT- HIP FX   ECG reviewed by me, the ED Provider: yes    Patient location:  ED and bedside  Previous ECG:     Previous ECG:  Compared to current    Comparison ECG info:  11/25/16    Similarity:  No change  Interpretation:     Interpretation: non-specific    Rate:     ECG rate:  99    ECG rate assessment: normal    Rhythm:     Rhythm: sinus rhythm    Ectopy:     Ectopy: none    QRS:     QRS axis:  Normal    QRS intervals:  Normal  Conduction:     Conduction: normal    ST segments:     ST segments:  Normal  T waves:     T waves: flattening      Flattening:  AVL, III and V1  Comments:      NO ECG SIGNS OF ISCHEMIA/ INJURY / Monalisa Wheeler MD  01/14/18 9922

## 2018-01-15 NOTE — ASSESSMENT & PLAN NOTE
· Status post mechanical fall alert oriented to person place and time denied syncopal/vasovagal symptomatology  · PT/OT likely need short-term rehabilitation at [facility]  · Continue pain control-Tylenol around the clock, aqua K pad, tramadol p r n  moderate pain, oxycodone 5 mg p r n  severe pain, gabapentin HS, Dilaudid 0 2 mg IV q 6 p r n  breakthrough

## 2018-01-15 NOTE — H&P
H&P- [address] 7/14/1922, 80 y o  female MRN: [id number]    Unit/Bed#: ED 05 Encounter: [id number]    Primary Care Provider: No primary care provider on file  Date and time admitted to hospital: 1/14/2018  6:57 PM        Ambulatory dysfunction   Assessment & Plan    · Status post mechanical fall alert oriented to person place and time denied syncopal/vasovagal symptomatology  · PT/OT likely need short-term rehabilitation at [facility]  · Continue pain control-Tylenol around the clock, aqua K pad, tramadol p r n  moderate pain, oxycodone 5 mg p r n  severe pain, gabapentin HS, Dilaudid 0 2 mg IV q 6 p r n  breakthrough        * Closed fracture of neck of right femur (HCC)   Assessment & Plan    · Likely secondary to mechanical fall with cranial involvement   · Obtain orthostatic BP, EKG  · Orthopedic surgery consult appreciate recommendations  NPO after midnight, strict bed rest continue maintenance fluids IVF normal saline at 75 mL/HR  · Continue pain control-Tylenol around the clock, aqua K pad, tramadol p r n  moderate pain, oxycodone 5 mg p r n  severe pain, gabapentin HS, Dilaudid 0 2 mg IV q 6 p r n  breakthrough        GERD (gastroesophageal reflux disease)   Assessment & Plan    · Stable  · Continue home medication-pantoprazole, Carafate, Bentyl        Anxiety and depression   Assessment & Plan    · Stable moods  · Continue home medication-Celexa            VTE Prophylaxis: Enoxaparin (Lovenox)  / sequential compression device   Code Status:  Full code-discussed the patient at the bedside  POLST: POLST information provided but not completed      Anticipated Length of Stay:  Patient will be admitted on an Inpatient basis with an anticipated length of stay of  at least 2 midnights  Justification for Hospital Stay:  Closed right femoral neck fracture with ambulatory dysfunction    Total Time for Visit, including Counseling / Coordination of Care: 1 hour    Greater than 50% of this total time spent on direct patient counseling and coordination of care  Chief Complaint:   "I knew exactly what I was doing a was walking with my grandson and daughter know what happened I just fell "    History of Present Illness: Anita Maldonado is a 80 y o  female past medical history GERD, anxiety/depression who presents with right hip pain status post fall earlier today  Patient was alert oriented to person place however not time admitted to a fall at [facility] earlier today  Patient stated while walking with grandson and rolling walker to friend's apartment patient may have tripped over rolling walker and proceeded to fall on right side with cranial involvement without loss of consciousness, loss of bowel or bladder, tremor-like activity, paresthesias, facial droop, slurred speech  Patient denied preceding chest pain, shortness of breath, lightheadedness or dizziness, headache or vision changes  Patient denied recent illnesses, sick contacts, abdominal pain, nausea vomiting or diarrhea  Patient grandson and nursing facility staff called EMS and transported patient from nursing facility to ED for further evaluation  In the ED, patient was found to be hemodynamically stable elevated white blood cell count at 18 67  Chest x-ray revealed no acute pulmonary disease a right hip x-ray showed a possible right femoral neck fracture, head CT negative for acute intracranial abnormalities, CT cervical spine revealed no bony abnormalities  Review of Systems:    Review of Systems   Constitutional: Positive for activity change  Negative for appetite change, chills, diaphoresis, fatigue, fever and unexpected weight change  HENT: Negative for congestion, postnasal drip, rhinorrhea, sinus pain, sinus pressure, sneezing, sore throat, tinnitus and trouble swallowing  Eyes: Negative for photophobia and visual disturbance     Respiratory: Negative for apnea, cough, choking, chest tightness, shortness of breath, wheezing and stridor  Cardiovascular: Negative for chest pain, palpitations and leg swelling  Gastrointestinal: Negative for abdominal distention, anal bleeding, blood in stool, constipation, diarrhea, nausea, rectal pain and vomiting  Endocrine: Negative for polyphagia and polyuria  Genitourinary: Negative for difficulty urinating, dyspareunia, dysuria, hematuria, pelvic pain, urgency and vaginal discharge  Musculoskeletal: Positive for back pain and gait problem  Negative for arthralgias, joint swelling, myalgias, neck pain and neck stiffness  Skin: Negative for rash  Neurological: Positive for weakness  Negative for tremors, seizures, syncope, speech difficulty and numbness  Psychiatric/Behavioral: Negative for confusion and decreased concentration  Past Medical and Surgical History:     Past Medical History:   Diagnosis Date    Anxiety     Depression     GERD (gastroesophageal reflux disease)     Lumbago     Osteoporosis     Thrombocytopenia (HCC)        No past surgical history on file  Meds/Allergies:    Prior to Admission medications    Medication Sig Start Date End Date Taking?  Authorizing Provider   acetaminophen (TYLENOL) 325 mg tablet Take 650 mg by mouth every 4 (four) hours as needed for mild pain     Yes Historical Provider, MD   citalopram (CeleXA) 20 mg tablet Take 20 mg by mouth daily   Yes Historical Provider, MD   dicyclomine (BENTYL) 20 mg tablet Take 1 tablet by mouth 2 (two) times a day as needed (Abdominal pain/cramping) 11/25/16  Yes Jose Maloney DO   levothyroxine 25 mcg tablet Take 25 mcg by mouth daily   Yes Historical Provider, MD   MELATONIN PO Take 10 mg by mouth daily at bedtime   Yes Historical Provider, MD   mineral oil enema Insert 1 enema into the rectum every other day as needed for constipation   Yes Historical Provider, MD   omeprazole (PriLOSEC) 40 MG capsule Take 40 mg by mouth daily   Yes Historical Provider, MD   ondansetron (ZOFRAN-ODT) 4 mg disintegrating tablet Take 4 mg by mouth every 8 (eight) hours as needed for nausea or vomiting   Yes Historical Provider, MD   polyethylene glycol (MIRALAX) 17 g packet Take 17 g by mouth daily   Yes Historical Provider, MD   polyvinyl alcohol (LIQUIFILM TEARS) 1 4 % ophthalmic solution Administer 1 drop to both eyes 3 (three) times a day   Yes Historical Provider, MD   senna-docusate sodium (SENOKOT-S) 8 6-50 mg per tablet Take 1 tablet by mouth daily   Yes Historical Provider, MD   sodium chloride 1 g tablet Take 1 g by mouth 2 (two) times a day   Yes Historical Provider, MD   sucralfate (CARAFATE) 1 g tablet Take 1 g by mouth 4 (four) times a day Half hour before meals and bedtime   Yes Historical Provider, MD   traMADol (ULTRAM) 50 mg tablet Take 50 mg by mouth daily at bedtime     Yes Historical Provider, MD   citalopram (CeleXA) 20 mg tablet Take 20 mg by mouth daily  1/14/18 Yes Historical Provider, MD     I have reviewed home medications with patient personally  Allergies: No Known Allergies    Social History:     Marital Status:    Occupation:  Retired  Patient Pre-hospital Living Situation:  Lives at [facility]  Patient Pre-hospital Level of Mobility:  Ambulates with rolling walker  Patient Pre-hospital Diet Restrictions:  None  Substance Use History:   History   Alcohol use Not on file     History   Smoking Status    Not on file   Smokeless Tobacco    Not on file     History   Drug use: Unknown       Family History:    No family history on file  Physical Exam:     Vitals:   Blood Pressure: 137/65 (01/14/18 2217)  Pulse: (!) 107 (01/14/18 2217)  Temperature: 97 7 °F (36 5 °C) (01/14/18 1910)  Temp Source: Oral (01/14/18 1910)  Respirations: 16 (01/14/18 2217)  Height: 5' 5" (165 1 cm) (01/14/18 1910)  Weight - Scale: 58 kg (127 lb 13 9 oz) (01/14/18 1910)  SpO2: 97 % (01/14/18 2217)    Physical Exam   Constitutional: She is oriented to person, place, and time   She appears well-developed  No distress  Frail, lying somewhat uncomfortable in bed in no acute distress   HENT:   Head: Normocephalic and atraumatic  Dry mucous membranes   Eyes: Conjunctivae and EOM are normal  Pupils are equal, round, and reactive to light  Right eye exhibits no discharge  Left eye exhibits no discharge  Neck: Normal range of motion  Neck supple  No JVD present  No tracheal deviation present  No thyromegaly present  Cardiovascular: Normal rate  Murmur heard  Systolic murmur is present with a grade of 3/6   DP pulses present bilaterally, no bilateral lower extremity edema   Pulmonary/Chest: Effort normal and breath sounds normal  No respiratory distress  She has no wheezes  She has no rales  She exhibits no tenderness  Abdominal: Bowel sounds are normal  She exhibits no distension and no mass  There is no tenderness  There is no rebound and no guarding  Musculoskeletal: She exhibits tenderness  She exhibits no edema or deformity  Decreased range of motion of right lower extremity, tenderness to palpation of right lateral hip   Neurological: She is alert and oriented to person, place, and time  She displays normal reflexes  No cranial nerve deficit  Coordination abnormal    Skin: She is not diaphoretic  Additional Data:     Lab Results: I have personally reviewed pertinent reports          Results from last 7 days  Lab Units 01/14/18 2042   WBC Thousand/uL 18 67*   HEMOGLOBIN g/dL 11 3*   HEMATOCRIT % 36 0   PLATELETS Thousands/uL 381   NEUTROS PCT % 80*   LYMPHS PCT % 8*   MONOS PCT % 11   EOS PCT % 1       Results from last 7 days  Lab Units 01/14/18 2042   SODIUM mmol/L 137   POTASSIUM mmol/L 4 1   CHLORIDE mmol/L 100   CO2 mmol/L 27   BUN mg/dL 20   CREATININE mg/dL 1 01   CALCIUM mg/dL 8 8   GLUCOSE RANDOM mg/dL 96           Imaging: I have personally reviewed pertinent films in PACS    CT cervical spine without contrast   Final Result by Shonna Loera MD (01/14 2023)      No cervical spine fracture or traumatic malalignment  Workstation performed: [id number]         CT head without contrast   Final Result by Ted Mac MD (01/14 2009)      No acute intracranial abnormality  Workstation performed: [id number]         XR hip/pelv 2-3 vws right    (Results Pending)   XR chest 1 view    (Results Pending)       EKG, Pathology, and Other Studies Reviewed on Admission:   · EKG:  Pending  · CT spine without contrast no cervical spine fracture  · CT head without contrast-no acute intracranial abnormalities  · Hip right x-ray pending likely right femoral neck fracture  · Chest x-ray-no acute pulmonary disease    Allscripts / Epic Records Reviewed: Yes     ** Please Note: This note has been constructed using a voice recognition system   **

## 2018-01-15 NOTE — ASSESSMENT & PLAN NOTE
· Likely secondary to mechanical fall with cranial involvement   · Obtain orthostatic BP, EKG  · Orthopedic surgery consult appreciate recommendations    NPO after midnight, strict bed rest continue maintenance fluids IVF normal saline at 75 mL/HR  · Continue pain control-Tylenol around the clock, aqua K pad, tramadol p r n  moderate pain, oxycodone 5 mg p r n  severe pain, gabapentin HS, Dilaudid 0 2 mg IV q 6 p r n  breakthrough

## 2018-01-15 NOTE — PHYSICAL THERAPY NOTE
Physical Therapy Cancellation Note    Referral received for PT eval and tx  Pt is pending surgical repair of right femur fx  PT consult on hold due to pending surgery  Please reconsult PT postoperatively as appropriate, w/ weight bearing status and activity orders      Kathi Pruitt , PT

## 2018-01-15 NOTE — ED NOTES
Called to Pt  Room  By family  Pt  Family upset that pt  Has not been seen by orthopedics at this time  Called ortho, spoke to Jaron  Ortho was notified of consult in the am by Abhijeet Reinoso  Ortho has not seen pt  At this time  Family made aware  Family still remains upset  Gemini Palmer  Supervisor notified at this time, requested supervisor to speak to family        Armani Saldivar RN  01/15/18 1500

## 2018-01-15 NOTE — ED NOTES
UA dipstick performed on urine collected prior to Miles catheter placement       Thomas Zee RN  01/15/18 9353

## 2018-01-16 ENCOUNTER — ANESTHESIA (INPATIENT)
Dept: PERIOP | Facility: HOSPITAL | Age: 83
DRG: 470 | End: 2018-01-16
Payer: MEDICARE

## 2018-01-16 ENCOUNTER — ANESTHESIA EVENT (INPATIENT)
Dept: PERIOP | Facility: HOSPITAL | Age: 83
DRG: 470 | End: 2018-01-16
Payer: MEDICARE

## 2018-01-16 PROBLEM — R01.1 MURMUR: Status: ACTIVE | Noted: 2018-01-16

## 2018-01-16 PROBLEM — F03.90 DEMENTIA (HCC): Status: ACTIVE | Noted: 2018-01-16

## 2018-01-16 LAB — MRSA NOSE QL CULT: NORMAL

## 2018-01-16 PROCEDURE — C1776 JOINT DEVICE (IMPLANTABLE): HCPCS | Performed by: ORTHOPAEDIC SURGERY

## 2018-01-16 PROCEDURE — 0SRR0JZ REPLACEMENT OF RIGHT HIP JOINT, FEMORAL SURFACE WITH SYNTHETIC SUBSTITUTE, OPEN APPROACH: ICD-10-PCS | Performed by: ORTHOPAEDIC SURGERY

## 2018-01-16 DEVICE — SELF CENTERING BI-POLAR HEAD 28MM ID 46MM OD
Type: IMPLANTABLE DEVICE | Site: HIP | Status: FUNCTIONAL
Brand: SELF CENTERING

## 2018-01-16 DEVICE — TRI-LOCK BPS FEMORAL STEM 12/14 TAPER TRI-LOCK BPS W/GRIPTION SIZE 8 STD 111MM
Type: IMPLANTABLE DEVICE | Site: HIP | Status: FUNCTIONAL
Brand: TRI-LOCK GRIPTION

## 2018-01-16 DEVICE — ARTICUL/EZE FEMORAL HEAD DIAMETER 28MM +5 12/14 TAPER
Type: IMPLANTABLE DEVICE | Site: HIP | Status: FUNCTIONAL
Brand: ARTICUL/EZE

## 2018-01-16 RX ORDER — CEFAZOLIN SODIUM 1 G/3ML
INJECTION, POWDER, FOR SOLUTION INTRAMUSCULAR; INTRAVENOUS AS NEEDED
Status: DISCONTINUED | OUTPATIENT
Start: 2018-01-16 | End: 2018-01-16 | Stop reason: SURG

## 2018-01-16 RX ORDER — PROPOFOL 10 MG/ML
INJECTION, EMULSION INTRAVENOUS AS NEEDED
Status: DISCONTINUED | OUTPATIENT
Start: 2018-01-16 | End: 2018-01-16 | Stop reason: SURG

## 2018-01-16 RX ORDER — ONDANSETRON 2 MG/ML
INJECTION INTRAMUSCULAR; INTRAVENOUS AS NEEDED
Status: DISCONTINUED | OUTPATIENT
Start: 2018-01-16 | End: 2018-01-16 | Stop reason: SURG

## 2018-01-16 RX ORDER — SODIUM CHLORIDE, SODIUM LACTATE, POTASSIUM CHLORIDE, CALCIUM CHLORIDE 600; 310; 30; 20 MG/100ML; MG/100ML; MG/100ML; MG/100ML
125 INJECTION, SOLUTION INTRAVENOUS CONTINUOUS
Status: DISCONTINUED | OUTPATIENT
Start: 2018-01-16 | End: 2018-01-16

## 2018-01-16 RX ORDER — FENTANYL CITRATE/PF 50 MCG/ML
12.5 SYRINGE (ML) INJECTION
Status: DISCONTINUED | OUTPATIENT
Start: 2018-01-16 | End: 2018-01-16 | Stop reason: HOSPADM

## 2018-01-16 RX ORDER — SUCCINYLCHOLINE CHLORIDE 20 MG/ML
INJECTION INTRAMUSCULAR; INTRAVENOUS AS NEEDED
Status: DISCONTINUED | OUTPATIENT
Start: 2018-01-16 | End: 2018-01-16 | Stop reason: SURG

## 2018-01-16 RX ORDER — MAGNESIUM HYDROXIDE 1200 MG/15ML
LIQUID ORAL AS NEEDED
Status: DISCONTINUED | OUTPATIENT
Start: 2018-01-16 | End: 2018-01-16 | Stop reason: HOSPADM

## 2018-01-16 RX ORDER — ONDANSETRON 2 MG/ML
4 INJECTION INTRAMUSCULAR; INTRAVENOUS ONCE AS NEEDED
Status: DISCONTINUED | OUTPATIENT
Start: 2018-01-16 | End: 2018-01-16 | Stop reason: HOSPADM

## 2018-01-16 RX ORDER — FENTANYL CITRATE 50 UG/ML
INJECTION, SOLUTION INTRAMUSCULAR; INTRAVENOUS AS NEEDED
Status: DISCONTINUED | OUTPATIENT
Start: 2018-01-16 | End: 2018-01-16 | Stop reason: SURG

## 2018-01-16 RX ADMIN — TRAMADOL HYDROCHLORIDE 50 MG: 50 TABLET, FILM COATED ORAL at 06:19

## 2018-01-16 RX ADMIN — ACETAMINOPHEN 975 MG: 325 TABLET, FILM COATED ORAL at 21:09

## 2018-01-16 RX ADMIN — POLYVINYL ALCOHOL 1 DROP: 14 SOLUTION/ DROPS OPHTHALMIC at 10:20

## 2018-01-16 RX ADMIN — CITALOPRAM HYDROBROMIDE 20 MG: 20 TABLET ORAL at 10:18

## 2018-01-16 RX ADMIN — SUCCINYLCHOLINE CHLORIDE 100 MG: 20 INJECTION, SOLUTION INTRAMUSCULAR; INTRAVENOUS at 17:21

## 2018-01-16 RX ADMIN — FENTANYL CITRATE 50 MCG: 50 INJECTION INTRAMUSCULAR; INTRAVENOUS at 17:52

## 2018-01-16 RX ADMIN — OXYCODONE HYDROCHLORIDE 5 MG: 5 TABLET ORAL at 10:19

## 2018-01-16 RX ADMIN — GABAPENTIN 100 MG: 100 CAPSULE ORAL at 21:09

## 2018-01-16 RX ADMIN — PANTOPRAZOLE SODIUM 40 MG: 40 TABLET, DELAYED RELEASE ORAL at 06:19

## 2018-01-16 RX ADMIN — MELATONIN 6 MG: 3 TAB ORAL at 21:09

## 2018-01-16 RX ADMIN — PROPOFOL 60 MG: 10 INJECTION, EMULSION INTRAVENOUS at 17:21

## 2018-01-16 RX ADMIN — CEFAZOLIN SODIUM 1000 MG: 1 INJECTION, POWDER, FOR SOLUTION INTRAMUSCULAR; INTRAVENOUS at 17:32

## 2018-01-16 RX ADMIN — SUCRALFATE 1 G: 1 TABLET ORAL at 21:09

## 2018-01-16 RX ADMIN — FENTANYL CITRATE 50 MCG: 50 INJECTION INTRAMUSCULAR; INTRAVENOUS at 17:21

## 2018-01-16 RX ADMIN — LEVOTHYROXINE SODIUM 25 MCG: 25 TABLET ORAL at 06:19

## 2018-01-16 RX ADMIN — SODIUM CHLORIDE, SODIUM LACTATE, POTASSIUM CHLORIDE, AND CALCIUM CHLORIDE 125 ML/HR: .6; .31; .03; .02 INJECTION, SOLUTION INTRAVENOUS at 20:45

## 2018-01-16 RX ADMIN — ACETAMINOPHEN 975 MG: 325 TABLET, FILM COATED ORAL at 06:19

## 2018-01-16 RX ADMIN — POLYVINYL ALCOHOL 1 DROP: 14 SOLUTION/ DROPS OPHTHALMIC at 21:15

## 2018-01-16 RX ADMIN — SODIUM CHLORIDE 1000 ML: 0.9 INJECTION, SOLUTION INTRAVENOUS at 21:10

## 2018-01-16 RX ADMIN — ONDANSETRON 4 MG: 2 INJECTION INTRAMUSCULAR; INTRAVENOUS at 18:40

## 2018-01-16 NOTE — PROGRESS NOTES
Progress Note - Frank Burrows 7/14/1922, 80 y o  female MRN: [id number]    Unit/Bed#: -01 Encounter: [id number]    Primary Care Provider: No primary care provider on file  Date and time admitted to hospital: 1/14/2018  6:57 PM  * Closed displaced fracture of right femoral neck ([address] )   Assessment & Plan    · Patient seen by Orthopedics and plan for operative repair today  · Continue with pain control measures suitable to address pain but conservative considering her age and dementia  · Physical and occupational therapy to see her postoperatively but anticipate she will return to her prior nursing home        Dementia   Assessment & Plan    · Patient with dementia at baseline, anticipate she may have postoperative delirium        Murmur   Assessment & Plan    · Patient was already seen by my attending yesterday and cardiac clearance was provided  · Given her age and the fact that he would not likely change her treatment course, I will hold off on doing any workup including echocardiogram at this time        Ambulatory dysfunction   Assessment & Plan    · Status post mechanical fall  · PT/OT post op            VTE Pharmacologic Prophylaxis:   Pharmacologic: Other Medication: On hold for surgery  Mechanical VTE Prophylaxis in Place: No    Patient Centered Rounds: I have performed bedside rounds with nursing staff today  Discussions with Specialists or Other Care Team Provider:  Case management    Education and Discussions with Family / Patient:     Time Spent for Care: 20 minutes  More than 50% of total time spent on counseling and coordination of care as described above      Current Length of Stay: 2 day(s)    Current Patient Status: Inpatient   Certification Statement: The patient will continue to require additional inpatient hospital stay due to Surgery today    Discharge Plan:  Likely return to [address] [place] [address] after surgery when stable    Code Status: Level 1 - Full Code      Subjective:   Patient complaining of pain in her hip and also says her side hurts  Also complaining that she is bothered by the nasal cannula oxygen    Objective:     Vitals:   Temp (24hrs), Av 9 °F (36 6 °C), Min:97 8 °F (36 6 °C), Max:97 9 °F (36 6 °C)    HR:  [84-95] 84  Resp:  [16-20] 18  BP: (130-138)/(61-70) 130/68  SpO2:  [92 %-95 %] 94 %  Body mass index is 21 28 kg/m²  Input and Output Summary (last 24 hours): Intake/Output Summary (Last 24 hours) at 18 1236  Last data filed at 18 7321   Gross per 24 hour   Intake          1351 67 ml   Output              480 ml   Net           871 67 ml       Physical Exam:     Physical Exam   Constitutional: No distress  Frail elderly female lying in bed appears to be in some discomfort currently   HENT:   Head: Normocephalic and atraumatic  Eyes: Conjunctivae are normal  Right eye exhibits no discharge  Left eye exhibits no discharge  No scleral icterus  Neck: Neck supple  Cardiovascular: Normal rate and regular rhythm  Murmur (Systolic ejection murmur noted) heard  Pulmonary/Chest: No respiratory distress  She has no wheezes  Poor effort provided but appears to be grossly clear  Abdominal: Soft  Bowel sounds are normal  She exhibits no distension  There is no tenderness  There is no rebound and no guarding  Genitourinary:   Genitourinary Comments: Miles in place   Musculoskeletal: She exhibits no edema  Neurological: She is alert  Awake alert and interactive but overall a poor historian and cannot provide any orientation details  Not overtly combative   Skin: Skin is warm and dry  No rash noted  She is not diaphoretic  No erythema  No pallor  Nursing note and vitals reviewed      Additional Data:     Labs:      Results from last 7 days  Lab Units 01/15/18  0510 18  2042   WBC Thousand/uL 11 36* 18 67*   HEMOGLOBIN g/dL 10 1* 11 3*   HEMATOCRIT % 33 4* 36 0   PLATELETS Thousands/uL 309 381   NEUTROS PCT %  --  80*   LYMPHS PCT %  --  8* MONOS PCT %  --  11   EOS PCT %  --  1       Results from last 7 days  Lab Units 01/15/18  0510   SODIUM mmol/L 136   POTASSIUM mmol/L 4 1   CHLORIDE mmol/L 102   CO2 mmol/L 27   BUN mg/dL 18   CREATININE mg/dL 1 01   CALCIUM mg/dL 8 2*   TOTAL PROTEIN g/dL 6 6   BILIRUBIN TOTAL mg/dL 0 80   ALK PHOS U/L 72   ALT U/L 25   AST U/L 20   GLUCOSE RANDOM mg/dL 124       Results from last 7 days  Lab Units 01/15/18  0510   INR  1 14       * I Have Reviewed All Lab Data Listed Above  * Additional Pertinent Lab Tests Reviewed: Sandrine 66 Admission Reviewed    Imaging:    Imaging Reports Reviewed Today Include:   Imaging Personally Reviewed by Myself Includes:      Recent Cultures (last 7 days):           Last 24 Hours Medication List:     acetaminophen 975 mg Oral Q8H [facility]   citalopram 20 mg Oral Daily   enoxaparin 40 mg Subcutaneous Daily   gabapentin 100 mg Oral HS   levothyroxine 25 mcg Oral Early Morning   melatonin 6 mg Oral HS   pantoprazole 40 mg Oral Daily Before Breakfast   polyethylene glycol 17 g Oral Daily   polyvinyl alcohol 1 drop Both Eyes TID   senna-docusate sodium 1 tablet Oral Daily   sucralfate 1 g Oral 4x Daily        Today, Patient Was Seen By: Nathan Mott PA-C    ** Please Note: Dictation voice to text software may have been used in the creation of this document   **

## 2018-01-16 NOTE — ANESTHESIA PREPROCEDURE EVALUATION
Review of Systems/Medical History  Patient summary reviewed  Chart reviewed      Cardiovascular  Negative cardio ROS    Pulmonary  Negative pulmonary ROS        GI/Hepatic    GERD well controlled,  Hiatal hernia,        Negative  ROS        Endo/Other  Negative endo/other ROS      GYN  Negative gynecology ROS          Hematology  Negative hematology ROS      Musculoskeletal  Negative musculoskeletal ROS        Neurology   Psychology   Anxiety, Depression ,              Physical Exam    Airway    Mallampati score: II  TM Distance: <3 FB  Neck ROM: full     Dental   upper dentures and lower dentures,     Cardiovascular  Comment: Negative ROS, Cardiovascular exam normal    Pulmonary  Pulmonary exam normal     Other Findings        Anesthesia Plan  ASA Score- 2     Anesthesia Type- general with ASA Monitors  Additional Monitors:   Airway Plan: ETT  Plan Factors-  Patient did not smoke on day of surgery  Induction- intravenous  Postoperative Plan-   Planned trial extubation    Informed Consent- Anesthetic plan and risks discussed with legal guardian and son  I personally reviewed this patient with the CRNA  Discussed and agreed on the Anesthesia Plan with the CRNA  Brendan Foster

## 2018-01-16 NOTE — OCCUPATIONAL THERAPY NOTE
Occupational Therapy Cancellation Note        Patient Name: Florencio Patel  Today's Date: 1/16/2018    OT orders received and chart review completed  Plan for OR today for right hip hemiarthroplasty  Please re- consult following surgery as appropriate   D/C OT  Jean Goddard, OT

## 2018-01-16 NOTE — PLAN OF CARE
SAFETY,RESTRAINT: NV/NON-SELF DESTRUCTIVE BEHAVIOR     Returns to optimal restraint-free functioning Not Progressing          DISCHARGE PLANNING     Discharge to home or other facility with appropriate resources Progressing        INFECTION - ADULT     Absence or prevention of progression during hospitalization Progressing        Knowledge Deficit     Patient/family/caregiver demonstrates understanding of disease process, treatment plan, medications, and discharge instructions Progressing        PAIN - ADULT     Verbalizes/displays adequate comfort level or baseline comfort level Progressing        Potential for Falls     Patient will remain free of falls Progressing        Prexisting or High Potential for Compromised Skin Integrity     Skin integrity is maintained or improved Progressing        SAFETY ADULT     Maintain or return to baseline ADL function Progressing     Maintain or return mobility status to optimal level Progressing        SAFETY,RESTRAINT: NV/NON-SELF DESTRUCTIVE BEHAVIOR     Remains free of harm/injury (restraint for non violent/non self-detsructive behavior) Progressing

## 2018-01-16 NOTE — OP NOTE
OPERATIVE REPORT  PATIENT NAME: Rosanna Mesa    :  1922  MRN: [id number]  Pt Location: AN OR ROOM 04    SURGERY DATE: 2018    Surgeon(s) and Role:     * Linda Crystal, DO - Primary    Kaveh Moffett PAC utilized during the case for assistance with positioning prepping draping retraction closure and dressing application    Preop Diagnosis:  Closed displaced fracture of right femoral neck ([address] ) [S72 001A]    Post-Op Diagnosis Codes:     * Closed displaced fracture of right femoral neck ([address] ) [S72 001A]    Procedure(s) (LRB):  Right Hip Hemiarthroplasty (Right)    Specimen(s):  * No specimens in log *    Estimated Blood Loss:   Minimal    Drains:  Urethral Catheter Straight-tip 16 Fr  (Active)   Amt returned on insertion(mL) 100 mL 2018  9:54 PM   Site Assessment Clean;Skin intact 2018  4:17 PM   Collection Container Standard drainage bag 2018  4:17 PM   Securement Method Securing device (Describe) 2018  4:17 PM   Output (mL) 100 mL 2018 12:01 PM   Number of days: 2       Anesthesia Type:   Choice    Operative Indications:  Closed displaced fracture of right femoral neck (HCC) [S72 001A]      Operative Findings:  Femoral neck fracture    Complications:   None    Procedure and Technique:  Patient was identified in the preoperative area  The right lower extremity was marked the consent H and P had been reviewed  The patient was brought back to the operative suite where she was given a general anesthetic  She was then placed in a lateral recumbent position with the operative side up  An axillary roll was used under the dependent side  The lower extremity was protected with foam padding to protect the peroneal nerve  The right lower extremities prepped and draped in this lateral recumbent position with a beanbag positioner    After proper time-out commenced identified the right lower extremity as the operative site and incision was made curvilinear area in line with the femur and curving posterior from the posterior aspect of the greater trochanter  We dissected down through the subcutaneous tissue using electrocautery device identified our fascia  Made a incision in the fascia and then using our finger to protect the underlying tissue we dissected the gluteus muscle  We then used a Retana elevator to resect and retract the trochanteric bursal tissue  We excised the bursal tissue  We identified our piriformis muscle  We detached it from its insertion and placed 5  Ethibond into the tendon  We then made our tea and our capsule using electrocautery device again passed 5  Ethibond into both capsular limbs  We were able to excise the femoral head we sized this to be 46mm  We then used our trial bipolar component  We found this to be 46  We then moved onto our femur used our box cut to start our intramedullary position at 25° of anteversion  We then used our opening Reamer and then broached up to a size 8  We found good rotational stability we trialed head and neck sizes and found best fit to be size 8 with 46 outer diameter head  Our rotation at 90° of hip flexion was 65-70 degrees  We selected the implant to be size 8  We then drilled 3 holes into the lateral aspect of the greater trochanter for reattachment of capsule and piriformis  We closed the rest of the capsule with 5  Ethibond  Passed our sutures lateral to the greater trochanter and tied laterally  We then irrigated once again closed the gluteus musculature with interrupted sutures  We then closed deep tissue with 0 Vicryl subcutaneous that showed with 2 O Vicryl staples on skin  Mepilex dressings were applied patient had anesthesia reversed and taken back to PACU with hip abductor pillow in place  I was present for the entire procedure and a qualified resident was not available    Patient Disposition:  PACU     SIGNATURE: Aixa Mccall DO  DATE: January 16, 2018  TIME: 5:19 PM

## 2018-01-16 NOTE — ASSESSMENT & PLAN NOTE
· Patient seen by Orthopedics and plan for operative repair today  · Continue with pain control measures suitable to address pain but conservative considering her age and dementia  · Physical and occupational therapy to see her postoperatively but anticipate she will return to her prior nursing home

## 2018-01-16 NOTE — ASSESSMENT & PLAN NOTE
· Patient was already seen by my attending yesterday and cardiac clearance was provided  · Given her age and the fact that he would not likely change her treatment course, I will hold off on doing any workup including echocardiogram at this time

## 2018-01-17 PROBLEM — D50.0 BLOOD LOSS ANEMIA: Status: ACTIVE | Noted: 2018-01-17

## 2018-01-17 LAB
ANION GAP SERPL CALCULATED.3IONS-SCNC: 14 MMOL/L (ref 4–13)
ANISOCYTOSIS BLD QL SMEAR: PRESENT
BASOPHILS # BLD MANUAL: 0 THOUSAND/UL (ref 0–0.1)
BASOPHILS NFR MAR MANUAL: 0 % (ref 0–1)
BUN SERPL-MCNC: 16 MG/DL (ref 5–25)
CALCIUM SERPL-MCNC: 7.8 MG/DL (ref 8.3–10.1)
CHLORIDE SERPL-SCNC: 105 MMOL/L (ref 100–108)
CO2 SERPL-SCNC: 18 MMOL/L (ref 21–32)
CREAT SERPL-MCNC: 0.93 MG/DL (ref 0.6–1.3)
EOSINOPHIL # BLD MANUAL: 0 THOUSAND/UL (ref 0–0.4)
EOSINOPHIL NFR BLD MANUAL: 0 % (ref 0–6)
ERYTHROCYTE [DISTWIDTH] IN BLOOD BY AUTOMATED COUNT: 15.9 % (ref 11.6–15.1)
GFR SERPL CREATININE-BSD FRML MDRD: 52 ML/MIN/1.73SQ M
GLUCOSE SERPL-MCNC: 117 MG/DL (ref 65–140)
HCT VFR BLD AUTO: 28.4 % (ref 34.8–46.1)
HGB BLD-MCNC: 8.6 G/DL (ref 11.5–15.4)
LG PLATELETS BLD QL SMEAR: PRESENT
LYMPHOCYTES # BLD AUTO: 0.26 THOUSAND/UL (ref 0.6–4.47)
LYMPHOCYTES # BLD AUTO: 2 % (ref 14–44)
MCH RBC QN AUTO: 25.1 PG (ref 26.8–34.3)
MCHC RBC AUTO-ENTMCNC: 30.3 G/DL (ref 31.4–37.4)
MCV RBC AUTO: 83 FL (ref 82–98)
MONOCYTES # BLD AUTO: 2.09 THOUSAND/UL (ref 0–1.22)
MONOCYTES NFR BLD: 16 % (ref 4–12)
NEUTROPHILS # BLD MANUAL: 10.6 THOUSAND/UL (ref 1.85–7.62)
NEUTS SEG NFR BLD AUTO: 81 % (ref 43–75)
PLATELET # BLD AUTO: 239 THOUSANDS/UL (ref 149–390)
PLATELET BLD QL SMEAR: ADEQUATE
PMV BLD AUTO: 10.3 FL (ref 8.9–12.7)
POLYCHROMASIA BLD QL SMEAR: PRESENT
POTASSIUM SERPL-SCNC: 4 MMOL/L (ref 3.5–5.3)
RBC # BLD AUTO: 3.43 MILLION/UL (ref 3.81–5.12)
SODIUM SERPL-SCNC: 137 MMOL/L (ref 136–145)
TOTAL CELLS COUNTED SPEC: 100
VARIANT LYMPHS # BLD AUTO: 1 %
WBC # BLD AUTO: 13.09 THOUSAND/UL (ref 4.31–10.16)

## 2018-01-17 PROCEDURE — 80048 BASIC METABOLIC PNL TOTAL CA: CPT | Performed by: PHYSICIAN ASSISTANT

## 2018-01-17 PROCEDURE — 30233N1 TRANSFUSION OF NONAUTOLOGOUS RED BLOOD CELLS INTO PERIPHERAL VEIN, PERCUTANEOUS APPROACH: ICD-10-PCS | Performed by: INTERNAL MEDICINE

## 2018-01-17 PROCEDURE — 85007 BL SMEAR W/DIFF WBC COUNT: CPT | Performed by: PHYSICIAN ASSISTANT

## 2018-01-17 PROCEDURE — 85027 COMPLETE CBC AUTOMATED: CPT | Performed by: PHYSICIAN ASSISTANT

## 2018-01-17 PROCEDURE — P9016 RBC LEUKOCYTES REDUCED: HCPCS

## 2018-01-17 RX ORDER — OXYCODONE HYDROCHLORIDE 5 MG/1
2.5 TABLET ORAL EVERY 4 HOURS PRN
Status: DISCONTINUED | OUTPATIENT
Start: 2018-01-17 | End: 2018-01-18 | Stop reason: HOSPADM

## 2018-01-17 RX ORDER — CYCLOBENZAPRINE HCL 10 MG
5 TABLET ORAL ONCE
Status: COMPLETED | OUTPATIENT
Start: 2018-01-17 | End: 2018-01-17

## 2018-01-17 RX ORDER — LANOLIN ALCOHOL/MO/W.PET/CERES
3 CREAM (GRAM) TOPICAL
Status: DISCONTINUED | OUTPATIENT
Start: 2018-01-17 | End: 2018-01-18 | Stop reason: HOSPADM

## 2018-01-17 RX ADMIN — POLYETHYLENE GLYCOL 3350 17 G: 17 POWDER, FOR SOLUTION ORAL at 08:23

## 2018-01-17 RX ADMIN — SUCRALFATE 1 G: 1 TABLET ORAL at 22:55

## 2018-01-17 RX ADMIN — SODIUM CHLORIDE 250 ML: 0.9 INJECTION, SOLUTION INTRAVENOUS at 22:48

## 2018-01-17 RX ADMIN — LEVOTHYROXINE SODIUM 25 MCG: 25 TABLET ORAL at 06:07

## 2018-01-17 RX ADMIN — SENNOSIDES AND DOCUSATE SODIUM 1 TABLET: 8.6; 5 TABLET ORAL at 08:23

## 2018-01-17 RX ADMIN — SUCRALFATE 1 G: 1 TABLET ORAL at 16:47

## 2018-01-17 RX ADMIN — CITALOPRAM HYDROBROMIDE 20 MG: 20 TABLET ORAL at 08:23

## 2018-01-17 RX ADMIN — POLYVINYL ALCOHOL 1 DROP: 14 SOLUTION/ DROPS OPHTHALMIC at 22:50

## 2018-01-17 RX ADMIN — ACETAMINOPHEN 975 MG: 325 TABLET, FILM COATED ORAL at 06:07

## 2018-01-17 RX ADMIN — GABAPENTIN 100 MG: 100 CAPSULE ORAL at 22:54

## 2018-01-17 RX ADMIN — TRAMADOL HYDROCHLORIDE 50 MG: 50 TABLET, FILM COATED ORAL at 12:19

## 2018-01-17 RX ADMIN — POLYVINYL ALCOHOL 1 DROP: 14 SOLUTION/ DROPS OPHTHALMIC at 08:24

## 2018-01-17 RX ADMIN — CEFAZOLIN SODIUM 1000 MG: 1 SOLUTION INTRAVENOUS at 02:05

## 2018-01-17 RX ADMIN — POLYVINYL ALCOHOL 1 DROP: 14 SOLUTION/ DROPS OPHTHALMIC at 16:45

## 2018-01-17 RX ADMIN — SUCRALFATE 1 G: 1 TABLET ORAL at 08:23

## 2018-01-17 RX ADMIN — CYCLOBENZAPRINE HYDROCHLORIDE 5 MG: 10 TABLET, FILM COATED ORAL at 02:38

## 2018-01-17 RX ADMIN — OXYCODONE HYDROCHLORIDE 5 MG: 5 TABLET ORAL at 00:51

## 2018-01-17 RX ADMIN — ACETAMINOPHEN 975 MG: 325 TABLET, FILM COATED ORAL at 22:55

## 2018-01-17 RX ADMIN — PANTOPRAZOLE SODIUM 40 MG: 40 TABLET, DELAYED RELEASE ORAL at 06:07

## 2018-01-17 RX ADMIN — ENOXAPARIN SODIUM 40 MG: 40 INJECTION SUBCUTANEOUS at 08:23

## 2018-01-17 RX ADMIN — MELATONIN 3 MG: 3 TAB ORAL at 22:55

## 2018-01-17 RX ADMIN — ACETAMINOPHEN 975 MG: 325 TABLET, FILM COATED ORAL at 13:49

## 2018-01-17 RX ADMIN — CEFAZOLIN SODIUM 1000 MG: 1 SOLUTION INTRAVENOUS at 08:22

## 2018-01-17 RX ADMIN — SUCRALFATE 1 G: 1 TABLET ORAL at 12:19

## 2018-01-17 NOTE — ASSESSMENT & PLAN NOTE
· Agree with transfusion of 1 unit today given large drop in her hemoglobin and signs of tachycardia

## 2018-01-17 NOTE — OCCUPATIONAL THERAPY NOTE
Occupational Therapy         Patient Name: Anita Maldonado  Today's Date: 1/17/2018     OT evaluation attempted, patient receiving blood at this time  Will hold evaluation and re-attempt as able        Darell Walton MS, OTR/L

## 2018-01-17 NOTE — ASSESSMENT & PLAN NOTE
· Appreciate Orthopedics, patient is postop day 1 status post right hip fracture repair   · Continue with pain control measures suitable to address pain but conservative considering her age and dementia, at this time she appears to be overly sedated and has not eaten breakfast but also has facial grimace noted    · Physical and occupational therapy to see her postoperatively but anticipate she will return to her prior nursing home  · Patient with only 600 ml urine output in 24 hours, ok to keep trujillo for now but ideally remove later today

## 2018-01-17 NOTE — ASSESSMENT & PLAN NOTE
· Patient with very loud FARIHA noted on exam, highly suspicious for aortic stenosis  · Given her age and the fact that he would not likely change her treatment course, I will hold off on doing any workup including echocardiogram at this time  · Recommend avoiding volume overload  After her current bag of IV fluids I will discontinue further fluids for now    After her blood transfusion today consider dose of Lasix if patient demonstrates any signs of volume overload

## 2018-01-17 NOTE — PHYSICAL THERAPY NOTE
Physical Therapy Cancellation Note    Referral received for PT eval and tx  Chart check performed  Veena Larson reports pt is pending blood transfusion  Also pt is lethargic  Will follow and perform eval as appropriate      Kathi Pruitt , PT 1/17/2018

## 2018-01-17 NOTE — PROGRESS NOTES
Progress Note - Griselda Malady 7/14/1922, 80 y o  female MRN: [id number]    Unit/Bed#: -01 Encounter: [id number]    Primary Care Provider: No primary care provider on file  Date and time admitted to hospital: 1/14/2018  6:57 PM    * Closed displaced fracture of right femoral neck ([address] )   Assessment & Plan    · Appreciate Orthopedics, patient is postop day 1 status post right hip fracture repair   · Continue with pain control measures suitable to address pain but conservative considering her age and dementia, at this time she appears to be overly sedated and has not eaten breakfast but also has facial grimace noted  · Physical and occupational therapy to see her postoperatively but anticipate she will return to her prior nursing home  · Patient with only 600 ml urine output in 24 hours, ok to keep trujillo for now but ideally remove later today        Dementia   Assessment & Plan    · Patient with dementia at baseline, anticipate she may have postoperative delirium  · Prognosis post-op guarded, especially in light of the fact the patient is requiring restraints which is in turn limiting her ability to have adequate oral intake  Murmur   Assessment & Plan    · Patient with very loud FARIHA noted on exam, highly suspicious for aortic stenosis  · Given her age and the fact that he would not likely change her treatment course, I will hold off on doing any workup including echocardiogram at this time  · Recommend avoiding volume overload  After her current bag of IV fluids I will discontinue further fluids for now    After her blood transfusion today consider dose of Lasix if patient demonstrates any signs of volume overload        Blood loss anemia   Assessment & Plan    · Agree with transfusion of 1 unit today given large drop in her hemoglobin and signs of tachycardia          VTE Pharmacologic Prophylaxis:   Pharmacologic: Enoxaparin (Lovenox)  Mechanical VTE Prophylaxis in Place: Yes    Patient Centered Rounds: I have performed bedside rounds with nursing staff today  Discussions with Specialists or Other Care Team Provider:  Orthopedics, case management    Education and Discussions with Family / Patient:  Spoke with patient's son Sean You he requests that his wife Collette Marshal be the primary  from now on    Time Spent for Care: 25 minutes  More than 50% of total time spent on counseling and coordination of care as described above  Current Length of Stay: 3 day(s)    Current Patient Status: Inpatient   Certification Statement: The patient will continue to require additional inpatient hospital stay due to post op care    Discharge Plan: [facility]    Code Status: Level 3 - DNAR and DNI    Subjective:   Nursing reports patient has only had 600 mL of urine output last 24 hours  Nursing reports she is also overly sedated and has not had anything to eat or drink this morning however she has also been having pain and requiring medication and been agitated and requiring restraints  Objective:     Vitals:   Temp (24hrs), Av 1 °F (36 7 °C), Min:97 5 °F (36 4 °C), Max:99 1 °F (37 3 °C)    HR:  [] 113  Resp:  [16-22] 18  BP: ()/(50-73) 114/60  SpO2:  [93 %-100 %] 96 %  Body mass index is 22 23 kg/m²  Input and Output Summary (last 24 hours): Intake/Output Summary (Last 24 hours) at 18 1033  Last data filed at 18 0838   Gross per 24 hour   Intake              700 ml   Output              800 ml   Net             -100 ml       Physical Exam:     Physical Exam   Constitutional: No distress  Facial grimace noted although she is otherwise also sedated appearing, thin frail elderly female   HENT:   Head: Normocephalic and atraumatic  Eyes: Conjunctivae are normal  Right eye exhibits no discharge  Left eye exhibits no discharge  No scleral icterus  Neck: Neck supple  Cardiovascular: Normal rate and regular rhythm  Murmur (loud FARIHA) heard    Pulmonary/Chest: No respiratory distress  She has no wheezes  Limited effort   Abdominal: Soft  She exhibits no distension  Musculoskeletal: She exhibits no edema  Skin: Skin is warm and dry  No rash noted  She is not diaphoretic  No erythema  No pallor  Nursing note and vitals reviewed  Additional Data:     Labs:      Results from last 7 days  Lab Units 01/17/18  0522  01/14/18  2042   WBC Thousand/uL 13 09*  < > 18 67*   HEMOGLOBIN g/dL 8 6*  < > 11 3*   HEMATOCRIT % 28 4*  < > 36 0   PLATELETS Thousands/uL 239  < > 381   NEUTROS PCT %  --   --  80*   LYMPHS PCT %  --   --  8*   LYMPHO PCT % 2*  --   --    MONOS PCT %  --   --  11   MONO PCT MAN % 16*  --   --    EOS PCT %  --   --  1   EOSINO PCT MANUAL % 0  --   --    < > = values in this interval not displayed  Results from last 7 days  Lab Units 01/17/18  0522 01/15/18  0510   SODIUM mmol/L 137 136   POTASSIUM mmol/L 4 0 4 1   CHLORIDE mmol/L 105 102   CO2 mmol/L 18* 27   BUN mg/dL 16 18   CREATININE mg/dL 0 93 1 01   CALCIUM mg/dL 7 8* 8 2*   TOTAL PROTEIN g/dL  --  6 6   BILIRUBIN TOTAL mg/dL  --  0 80   ALK PHOS U/L  --  72   ALT U/L  --  25   AST U/L  --  20   GLUCOSE RANDOM mg/dL 117 124       Results from last 7 days  Lab Units 01/15/18  0510   INR  1 14       * I Have Reviewed All Lab Data Listed Above  * Additional Pertinent Lab Tests Reviewed:  All Labs Within Last 24 Hours Reviewed    Imaging:    Imaging Reports Reviewed Today Include:   Imaging Personally Reviewed by Myself Includes:      Recent Cultures (last 7 days):           Last 24 Hours Medication List:     acetaminophen 975 mg Oral Q8H [facility]   citalopram 20 mg Oral Daily   enoxaparin 40 mg Subcutaneous Daily   gabapentin 100 mg Oral HS   levothyroxine 25 mcg Oral Early Morning   melatonin 3 mg Oral HS   pantoprazole 40 mg Oral Daily Before Breakfast   polyethylene glycol 17 g Oral Daily   polyvinyl alcohol 1 drop Both Eyes TID   senna-docusate sodium 1 tablet Oral Daily   sucralfate 1 g Oral 4x Daily        Today, Patient Was Seen By: Zeina Guillory PA-C    ** Please Note: Dictation voice to text software may have been used in the creation of this document   **

## 2018-01-17 NOTE — ANESTHESIA POSTPROCEDURE EVALUATION
Post-Op Assessment Note      CV Status:  Stable    Mental Status:  Alert    Hydration Status:  Stable    PONV Controlled:  None    Airway Patency:  Patent    Post Op Vitals Reviewed: Yes          Staff: AnesthesiologistNAPOLEON           BP   129/60   Temp   99 1   Pulse  79   Resp   16   SpO2   100

## 2018-01-17 NOTE — ASSESSMENT & PLAN NOTE
· Patient with dementia at baseline, anticipate she may have postoperative delirium  · Prognosis post-op guarded, especially in light of the fact the patient is requiring restraints which is in turn limiting her ability to have adequate oral intake

## 2018-01-17 NOTE — PROGRESS NOTES
[address] [place] [address] 80 y o  female MRN: [id number]  Unit/Bed#: -01      Subjective:  89 y  o female post operative day 1 right hip hemiarthroplasty  Pt doing well  Pain controlled  [unfilled]    Physical:  Vitals:    01/17/18 0447   BP: 118/70   Pulse: 96   Resp: 20   Temp: 98 2 °F (36 8 °C)   SpO2:      right lower extremity  · Dressing clean dry intact  · Sensation intact L1-S1  · Motor intact to knee flexion/extension, EHL/FHL  · 2+ dorsalis pedis pulse    _*_*_*_*_*_*_*_*_*_*_*_*_*_*_*_*_*_*_*_*_*_*_*_*_*_*_*_*_*_*_*_*_*_*_*_*_*_*_*_*_*    Assessment: 89 y  o female post operative day 1 right hip hemiarthroplasty  Plan:  · Up and out of bed  · Weightbearing as tolerated  · PT/OT  · DVT prophylaxis  · Analgesics  · Dispo: Ortho will follow  Patient noted to have acute blood loss anemia due to a drop in Hbg of > 2 0g from preop levels, will monitor vital signs and resuscitate with IV fluids as needed  Vital signs stable currently but high pulse rate  May benefit from transfusion 1 unit spoke with Marya Suarez agreed    We will also DC the Miles today  Guera Mcneill DO

## 2018-01-18 VITALS
HEART RATE: 102 BPM | HEIGHT: 65 IN | BODY MASS INDEX: 22.26 KG/M2 | TEMPERATURE: 98.1 F | RESPIRATION RATE: 20 BRPM | OXYGEN SATURATION: 96 % | DIASTOLIC BLOOD PRESSURE: 60 MMHG | SYSTOLIC BLOOD PRESSURE: 112 MMHG | WEIGHT: 133.6 LBS

## 2018-01-18 LAB
ABO GROUP BLD BPU: NORMAL
ANION GAP SERPL CALCULATED.3IONS-SCNC: 9 MMOL/L (ref 4–13)
BASOPHILS # BLD MANUAL: 0.13 THOUSAND/UL (ref 0–0.1)
BASOPHILS NFR MAR MANUAL: 1 % (ref 0–1)
BPU ID: NORMAL
BUN SERPL-MCNC: 25 MG/DL (ref 5–25)
CALCIUM SERPL-MCNC: 8 MG/DL (ref 8.3–10.1)
CHLORIDE SERPL-SCNC: 103 MMOL/L (ref 100–108)
CO2 SERPL-SCNC: 21 MMOL/L (ref 21–32)
CREAT SERPL-MCNC: 1.13 MG/DL (ref 0.6–1.3)
CROSSMATCH: NORMAL
EOSINOPHIL # BLD MANUAL: 0.13 THOUSAND/UL (ref 0–0.4)
EOSINOPHIL NFR BLD MANUAL: 1 % (ref 0–6)
ERYTHROCYTE [DISTWIDTH] IN BLOOD BY AUTOMATED COUNT: 15.2 % (ref 11.6–15.1)
GFR SERPL CREATININE-BSD FRML MDRD: 41 ML/MIN/1.73SQ M
GLUCOSE SERPL-MCNC: 117 MG/DL (ref 65–140)
HCT VFR BLD AUTO: 29.2 % (ref 34.8–46.1)
HGB BLD-MCNC: 9.3 G/DL (ref 11.5–15.4)
LG PLATELETS BLD QL SMEAR: PRESENT
LYMPHOCYTES # BLD AUTO: 0.39 THOUSAND/UL (ref 0.6–4.47)
LYMPHOCYTES # BLD AUTO: 3 % (ref 14–44)
MCH RBC QN AUTO: 26.1 PG (ref 26.8–34.3)
MCHC RBC AUTO-ENTMCNC: 31.8 G/DL (ref 31.4–37.4)
MCV RBC AUTO: 82 FL (ref 82–98)
MONOCYTES # BLD AUTO: 1.7 THOUSAND/UL (ref 0–1.22)
MONOCYTES NFR BLD: 13 % (ref 4–12)
NEUTROPHILS # BLD MANUAL: 10.74 THOUSAND/UL (ref 1.85–7.62)
NEUTS SEG NFR BLD AUTO: 82 % (ref 43–75)
PLATELET # BLD AUTO: 234 THOUSANDS/UL (ref 149–390)
PLATELET BLD QL SMEAR: ADEQUATE
PMV BLD AUTO: 10.2 FL (ref 8.9–12.7)
POTASSIUM SERPL-SCNC: 4.4 MMOL/L (ref 3.5–5.3)
RBC # BLD AUTO: 3.57 MILLION/UL (ref 3.81–5.12)
SODIUM SERPL-SCNC: 133 MMOL/L (ref 136–145)
TOTAL CELLS COUNTED SPEC: 100
UNIT DISPENSE STATUS: NORMAL
UNIT PRODUCT CODE: NORMAL
UNIT RH: NORMAL
WBC # BLD AUTO: 13.1 THOUSAND/UL (ref 4.31–10.16)

## 2018-01-18 PROCEDURE — 85007 BL SMEAR W/DIFF WBC COUNT: CPT | Performed by: PHYSICIAN ASSISTANT

## 2018-01-18 PROCEDURE — 85027 COMPLETE CBC AUTOMATED: CPT | Performed by: PHYSICIAN ASSISTANT

## 2018-01-18 PROCEDURE — 80048 BASIC METABOLIC PNL TOTAL CA: CPT | Performed by: PHYSICIAN ASSISTANT

## 2018-01-18 PROCEDURE — G8978 MOBILITY CURRENT STATUS: HCPCS

## 2018-01-18 PROCEDURE — 97163 PT EVAL HIGH COMPLEX 45 MIN: CPT

## 2018-01-18 PROCEDURE — 97110 THERAPEUTIC EXERCISES: CPT

## 2018-01-18 PROCEDURE — G8979 MOBILITY GOAL STATUS: HCPCS

## 2018-01-18 RX ORDER — ACETAMINOPHEN 325 MG/1
975 TABLET ORAL 3 TIMES DAILY
Qty: 30 TABLET | Refills: 0
Start: 2018-01-18

## 2018-01-18 RX ORDER — TRAMADOL HYDROCHLORIDE 50 MG/1
50 TABLET ORAL EVERY 8 HOURS PRN
Qty: 10 TABLET | Refills: 0 | Status: ON HOLD | OUTPATIENT
Start: 2018-01-18 | End: 2018-01-24

## 2018-01-18 RX ORDER — TRAMADOL HYDROCHLORIDE 50 MG/1
50 TABLET ORAL EVERY 8 HOURS PRN
Qty: 10 TABLET | Refills: 0 | Status: SHIPPED | OUTPATIENT
Start: 2018-01-18 | End: 2018-01-18

## 2018-01-18 RX ADMIN — SUCRALFATE 1 G: 1 TABLET ORAL at 12:56

## 2018-01-18 RX ADMIN — SENNOSIDES AND DOCUSATE SODIUM 1 TABLET: 8.6; 5 TABLET ORAL at 08:05

## 2018-01-18 RX ADMIN — ENOXAPARIN SODIUM 40 MG: 40 INJECTION SUBCUTANEOUS at 08:06

## 2018-01-18 RX ADMIN — ACETAMINOPHEN 975 MG: 325 TABLET, FILM COATED ORAL at 06:35

## 2018-01-18 RX ADMIN — TRAMADOL HYDROCHLORIDE 50 MG: 50 TABLET, FILM COATED ORAL at 08:05

## 2018-01-18 RX ADMIN — ACETAMINOPHEN 975 MG: 325 TABLET, FILM COATED ORAL at 13:33

## 2018-01-18 RX ADMIN — CITALOPRAM HYDROBROMIDE 20 MG: 20 TABLET ORAL at 08:05

## 2018-01-18 RX ADMIN — LEVOTHYROXINE SODIUM 25 MCG: 25 TABLET ORAL at 06:35

## 2018-01-18 RX ADMIN — POLYVINYL ALCOHOL 1 DROP: 14 SOLUTION/ DROPS OPHTHALMIC at 08:06

## 2018-01-18 RX ADMIN — PANTOPRAZOLE SODIUM 40 MG: 40 TABLET, DELAYED RELEASE ORAL at 06:35

## 2018-01-18 RX ADMIN — POLYETHYLENE GLYCOL 3350 17 G: 17 POWDER, FOR SOLUTION ORAL at 08:05

## 2018-01-18 RX ADMIN — POLYVINYL ALCOHOL 1 DROP: 14 SOLUTION/ DROPS OPHTHALMIC at 15:43

## 2018-01-18 RX ADMIN — SUCRALFATE 1 G: 1 TABLET ORAL at 08:05

## 2018-01-18 RX ADMIN — TRAMADOL HYDROCHLORIDE 50 MG: 50 TABLET, FILM COATED ORAL at 15:43

## 2018-01-18 NOTE — PLAN OF CARE
Problem: PHYSICAL THERAPY ADULT  Goal: Performs mobility at highest level of function for planned discharge setting  See evaluation for individualized goals  Treatment/Interventions: Functional transfer training, LE strengthening/ROM, Therapeutic exercise, Endurance training, Cognitive reorientation, Patient/family training, Equipment eval/education, Bed mobility, Continued evaluation, Spoke to case management, Spoke to nursing  Equipment Recommended: Other (Comment) (TBD)       See flowsheet documentation for full assessment, interventions and recommendations  Prognosis: Fair  Problem List: Decreased strength, Decreased range of motion, Decreased endurance, Impaired balance, Decreased mobility, Decreased coordination, Decreased cognition, Impaired judgement, Decreased safety awareness, Impaired vision, Pain, Orthopedic restrictions           Recommendation: Post acute IP rehab          See flowsheet documentation for full assessment

## 2018-01-18 NOTE — ASSESSMENT & PLAN NOTE
· Patient with very loud FARIHA noted on exam, highly suspicious for aortic stenosis  · Given her age and the fact that he would not likely change her treatment course, I will hold off on doing any workup including echocardiogram at this time  · Recommend avoiding volume overload

## 2018-01-18 NOTE — MALNUTRITION/BMI
This medical record reflects one or more clinical indicators suggestive of malnutrition and/or morbid obesity  Please indicate the one diagnosis below which you feel best reflects the clinical picture  Malnutrition Findings:   Malnutrition type: Chronic illness  Degree of Malnutrition: Malnutrition of moderate degree    BMI Findings: Body mass index is 22 23 kg/m²  See Nutrition note dated 1/18/2018 for additional details  Completed nutrition assessment is viewable in the nutrition documentation  Moderate malnutrition related to inadequate oral intake, recent surgery  as evidenced by temporal indentation, orbital hollow,depletion body fat, poor intake since admission

## 2018-01-18 NOTE — ASSESSMENT & PLAN NOTE
· Appreciate Orthopedics, patient is postop day 2 status post right hip fracture repair --ortho stable  · Continue with pain control measures --doing much better today  · Physical and occupational therapy to see her today but anticipate she will return to her prior nursing home

## 2018-01-18 NOTE — PHYSICAL THERAPY NOTE
PHYSICAL THERAPY EVALUATION  NAME:  [facility]  DATE: 01/18/18    AGE:   89 y o  Mrn:   [id number]  ADMIT DX:  Head injuries [G70 79RL]  Fall, initial encounter [W19  XXXA]  Closed displaced fracture of right femoral neck (HCC) [S72 001A]  Avulsion of skin of right elbow, initial encounter [S51 001A]    Past Medical History:   Diagnosis Date    Anxiety     Depression     GERD (gastroesophageal reflux disease)     Lumbago     Osteoporosis     Thrombocytopenia ([address] )      Length Of Stay: 4  Performed at least 2 patient identifiers during session: Name and wristband    PHYSICAL THERAPY EVALUATION :    01/18/18 13:40-13:45+14:00-14:08   Note Type   Note type Eval/Treat   Pain Assessment   Pain Assessment FLACC   Pain Location Hip   Pain Orientation Left   Pain Rating: FLACC (Rest) - Face 0   Pain Rating: FLACC (Rest) - Legs 1   Pain Rating: FLACC (Rest) - Activity 1   Pain Rating: FLACC (Rest) - Cry 0   Pain Rating: FLACC (Rest) - Consolability 0   Score: FLACC (Rest) 2   Pain Rating: FLACC (Activity) - Face 1   Pain Rating: FLACC (Activity) - Legs 1   Pain Rating: FLACC (Activity) - Activity 1   Pain Rating: FLACC (Activity) - Cry 1   Pain Rating: FLACC (Activity) - Consolability 1   Score: FLACC (Activity) 5   Home Living   Type of Home SNF  ([facility])   Home Equipment Walker  (as per H&P)   Prior Function   Falls in the last 6 months (at least one, pt unable to quantify)   Restrictions/Precautions   Weight Bearing Precautions Per Order Yes   RLE Weight Bearing Per Order WBAT   Other Precautions Bed Alarm; Chair Alarm;Cognitive; Impulsive; Fall Risk;Pain;Contact/isolation   General   Additional Pertinent History 89 y  o female POD 2 s/p Right Hip Hemiarthroplasty for femoral neck fracture   Plan:Pain Control, WBAT RLE, PT/OT, Posterior Hip Precautions   Family/Caregiver Present No   RUE Strength   RUE Overall Strength Within Functional Limits - able to perform ADL tasks with strength   LUE Strength   LUE Overall Strength Within Functional Limits - able to perform ADL tasks with strength   RLE Assessment   RLE Assessment X   RLE Overall AROM   R Hip Flexion limited   R Hip ABduction limited   R Knee Flexion limited   R Knee Extension WFL   R Ankle Dorsiflexion WFL   Strength RLE   R Hip Flexion 2-/5   R Hip ABduction 1/5   R Hip ADduction 1/5   R Knee Extension 2/5   R Ankle Dorsiflexion 2+/5   LLE Assessment   LLE Assessment X   Strength LLE   L Hip Flexion 3/5   L Hip ABduction 2/5   L Hip ADduction 2/5   L Knee Extension 3-/5   L Ankle Dorsiflexion 3-/5   Coordination   Movements are Fluid and Coordinated 0   Coordination and Movement Description rigid, bradykinetic   Sensation X  (limited vision)   Light Touch   RLE Light Touch Not tested   LLE Light Touch Not tested   Bed Mobility   Rolling R 2  Maximal assistance   Additional items Assist x 1; Increased time required;Verbal cues; Bedrails   Rolling L 2  Maximal assistance   Additional items Assist x 1; Increased time required;Verbal cues; Bedrails   Supine to Sit Unable to assess  (due to pain)   Transfers   Sit to Stand Unable to assess   Ambulation/Elevation   Gait pattern Not tested   Activity Tolerance   Activity Tolerance Patient limited by fatigue;Patient limited by pain   Medical Staff Made Aware spoke to Pepito Rosenberg from case mangement   Nurse Made Aware spoke to [address] RN   Assessment   Prognosis Fair   Problem List Decreased strength;Decreased range of motion;Decreased endurance; Impaired balance;Decreased mobility; Decreased coordination;Decreased cognition; Impaired judgement;Decreased safety awareness; Impaired vision;Pain;Orthopedic restrictions   Goals   Patient Goals less pain   STG Expiration Date 01/28/18   Short Term Goal #1 Pt will perform rolling with min A, verbalize 3/ 3 THPs, assess supine <>sit, transfers and gait and set goals  Increase BLE strength by 1 grade  assess WC mobility and set goals      Treatment Day 1   Plan Treatment/Interventions Functional transfer training;LE strengthening/ROM; Therapeutic exercise; Endurance training;Cognitive reorientation;Patient/family training;Equipment eval/education; Bed mobility;Continued evaluation;Spoke to case management;Spoke to nursing   PT Frequency 5x/wk   Recommendation   Recommendation Post acute IP rehab   Equipment Recommended Other (Comment)  (TBD)   Barthel Index   Feeding 5   Bathing 0   Grooming Score 0   Dressing Score 0   Bladder Score 0   Bowels Score 0   Toilet Use Score 0   Transfers (Bed/Chair) Score 0   Mobility (Level Surface) Score 0   Stairs Score 0   Barthel Index Score 5   (Please find full objective findings from PT assessment regarding body systems outlined above)  Assessment: Pt is a 80 y o  female seen for PT evaluation s/p admit to [facility] on 1/14/2018 w/ Closed displaced fracture of right femoral neck ([address] )  Pt is now POD 2 s/p Right Hip , is  WBAT RLE, Posterior Hip Precautions  Order placed for PT  Prior to admission, pt used rolling walker for mobility and lived at [place]  Upon evaluation: Pt requires A Of 1 for rolling, and was unable to particiapte in supine<>sit, transfers, nor ambulation due to pain  Pt's clinical presentation is currently unstable/unpredictable given the functional mobility deficits above, especially weakness, decreased ROM, pain, decreased functional mobility tolerance, fall risk, orthopedic restrictions and decreased cognition, combined with medical complications of abnormal H&H, abnormal WBCs, abnormal sodium values, low SpO2 values and history of falls  Pt to benefit from continued skilled PT tx while in hospital and upon DC to address deficits as defined above and maximize level of functional mobility   From PT/mobility standpoint, recommendation at time of d/c would be inpatient rehab pending progress in order to maximize pt's functional independence and consistency w/ mobility in order to facilitate return to PLOF  Recommend ther ex next 1-2 session, mechanical conveyance from nursing standpoint for OOB mobility and attempts with OOB mobility using walker vs quick move  The following objective measures were performed on IE: Barthel Index 5/100  Comorbidities affecting pt's physical performance at time of assessment include: limited vision, limited cognition and back pain  Personal factors affecting pt at time of IE include: anxiety, advanced age, behavioral pattern, inability to perform IADLs, inability to perform ADLs, recent fall(s) and questionable non-compliance  Arsenio Man PT, DPT  PHYSICAL THERAPY TREATMENT NOTE  Time In:  Time Out:  Total Time:       S:  Pt needs encouragement to participate  O:    Exercises    Side/Reps/sets   Heelslides R, 10 reps x 2 sets AAROM   Glute Sets 0   Hip Abduction R, 10 reps x 2 sets AAROM   Hip Adduction R, 10 reps x 2 sets AAROM, to neutral   Knee AROM Short Arc Quad R, 10 reps x 2 sets AAROM   Ankle Pumps R, 10 reps x 2 sets AAROM   Quad sets 0   Long Arc Quads 0   Hamstring Stretch RIGHT, 1 rep w/20 second hold   Heel Cord Stretch RIGHT, 1 rep w/20 second hold   Education Instructed re: hip precautions  A:  Pt requires >50% physical assistance to perform exercises, and requires >50% verbal instruction or tactile feedback to perform exercises with proper form and technique     P:  Recommend addition of therapeutic exercises to current functional mobility program as well as therex    Arsenio Man PT DPT

## 2018-01-18 NOTE — PLAN OF CARE

## 2018-01-18 NOTE — ASSESSMENT & PLAN NOTE
· s/p transfusion of 1 unit on 1/17/18 given large drop in her hemoglobin and signs of tachycardia--now stable

## 2018-01-18 NOTE — PROGRESS NOTES
Progress Note - Prema Orellana 7/14/1922, 80 y o  female MRN: [id number]    Unit/Bed#: -01 Encounter: [id number]    Primary Care Provider: No primary care provider on file  Date and time admitted to hospital: 1/14/2018  6:57 PM  * Closed displaced fracture of right femoral neck ([address] )   Assessment & Plan    · Appreciate Orthopedics, patient is postop day 2 status post right hip fracture repair --ortho stable  · Continue with pain control measures --doing much better today  · Physical and occupational therapy to see her today but anticipate she will return to her prior nursing home          Dementia   Assessment & Plan    · Patient with dementia at baseline, much more alert and interactive today          Murmur   Assessment & Plan    · Patient with very loud FARIHA noted on exam, highly suspicious for aortic stenosis  · Given her age and the fact that he would not likely change her treatment course, I will hold off on doing any workup including echocardiogram at this time  · Recommend avoiding volume overload  Blood loss anemia   Assessment & Plan    · s/p transfusion of 1 unit on 1/17/18 given large drop in her hemoglobin and signs of tachycardia--now stable          VTE Pharmacologic Prophylaxis:   Pharmacologic: Enoxaparin (Lovenox)  Mechanical VTE Prophylaxis in Place: Yes    Patient Centered Rounds: I have performed bedside rounds with nursing staff today  Discussions with Specialists or Other Care Team Provider: case mgmt    Education and Discussions with Family / Patient: Julissa More    Time Spent for Care: 20 minutes  More than 50% of total time spent on counseling and coordination of care as described above      Current Length of Stay: 4 day(s)    Current Patient Status: Inpatient   Certification Statement: The patient will continue to require additional inpatient hospital stay due to awaiting pt eval and dc     Discharge Plan: to SNF    Code Status: Level 3 - DNAR and DNI      Subjective: Nursing reports the patient is doing much better today  Her food and drink intake and her urine output has picked up and she tells me that her pain is controlled  She is more alert  She denies any shortness of breath  Nursing has been weaning her off her oxygen and trying to educate her on incentive spirometry  Objective:     Vitals:   Temp (24hrs), Av °F (36 7 °C), Min:97 4 °F (36 3 °C), Max:98 3 °F (36 8 °C)    HR:  [102-116] 102  Resp:  [20-26] 20  BP: ()/(55-60) 112/60  SpO2:  [89 %-97 %] 89 %  Body mass index is 22 23 kg/m²  Input and Output Summary (last 24 hours): Intake/Output Summary (Last 24 hours) at 18 1237  Last data filed at 18 0937   Gross per 24 hour   Intake              650 ml   Output              258 ml   Net              392 ml       Physical Exam:     Physical Exam   Constitutional: No distress  HENT:   Head: Normocephalic and atraumatic  Mouth/Throat: No oropharyngeal exudate  Eyes: Conjunctivae are normal  Right eye exhibits no discharge  Left eye exhibits no discharge  No scleral icterus  Neck: Neck supple  Cardiovascular: Normal rate and regular rhythm  Murmur (loud FARIHA) heard  Pulmonary/Chest: No respiratory distress  She has no wheezes  Mild basilar crackles   Abdominal: Soft  She exhibits no distension  There is no tenderness  Musculoskeletal: She exhibits no edema  Neurological: She is alert  Skin: Skin is warm and dry  No rash noted  She is not diaphoretic  No erythema  No pallor  Psychiatric: She has a normal mood and affect  Nursing note and vitals reviewed        Additional Data:     Labs:      Results from last 7 days  Lab Units 18  0455  18  2042   WBC Thousand/uL 13 10*  < > 18 67*   HEMOGLOBIN g/dL 9 3*  < > 11 3*   HEMATOCRIT % 29 2*  < > 36 0   PLATELETS Thousands/uL 234  < > 381   NEUTROS PCT %  --   --  80*   LYMPHS PCT %  --   --  8*   LYMPHO PCT % 3*  < >  --    MONOS PCT %  --   --  11   MONO PCT MAN % 13*  < >  --    EOS PCT %  --   --  1   EOSINO PCT MANUAL % 1  < >  --    < > = values in this interval not displayed  Results from last 7 days  Lab Units 01/18/18  0455  01/15/18  0510   SODIUM mmol/L 133*  < > 136   POTASSIUM mmol/L 4 4  < > 4 1   CHLORIDE mmol/L 103  < > 102   CO2 mmol/L 21  < > 27   BUN mg/dL 25  < > 18   CREATININE mg/dL 1 13  < > 1 01   CALCIUM mg/dL 8 0*  < > 8 2*   TOTAL PROTEIN g/dL  --   --  6 6   BILIRUBIN TOTAL mg/dL  --   --  0 80   ALK PHOS U/L  --   --  72   ALT U/L  --   --  25   AST U/L  --   --  20   GLUCOSE RANDOM mg/dL 117  < > 124   < > = values in this interval not displayed  Results from last 7 days  Lab Units 01/15/18  0510   INR  1 14       * I Have Reviewed All Lab Data Listed Above  * Additional Pertinent Lab Tests Reviewed: All Labs Within Last 24 Hours Reviewed    Imaging:    Imaging Reports Reviewed Today Include:   Imaging Personally Reviewed by Myself Includes:      Recent Cultures (last 7 days):           Last 24 Hours Medication List:     acetaminophen 975 mg Oral Q8H [address]   citalopram 20 mg Oral Daily   enoxaparin 40 mg Subcutaneous Daily   gabapentin 100 mg Oral HS   levothyroxine 25 mcg Oral Early Morning   melatonin 3 mg Oral HS   pantoprazole 40 mg Oral Daily Before Breakfast   polyethylene glycol 17 g Oral Daily   polyvinyl alcohol 1 drop Both Eyes TID   senna-docusate sodium 1 tablet Oral Daily   sucralfate 1 g Oral 4x Daily        Today, Patient Was Seen By: Rigo Hernandez PA-C    ** Please Note: Dictation voice to text software may have been used in the creation of this document   **

## 2018-01-18 NOTE — SOCIAL WORK
Pt is ready for discharge to go back to [place], will need rehab  CM spoke to [facility] in admissions at [place] to make her aware Pt will need rehab  Pt will go back to [facility] and get rehab  CM arranged BLS via [facility] for 4:30 pm  CM made Marcus and nurse Pavan Jones aware of Pt's  time  CM called Pt's son's work and was told he left for the day  CM contacted Pt's emergency contact Marianne Pantoja who is Mick's wife and made her aware of  time

## 2018-01-19 ENCOUNTER — HOSPITAL ENCOUNTER (INPATIENT)
Facility: HOSPITAL | Age: 83
LOS: 1 days | Discharge: NURSING, REHAB OR LONG-TERM CARE | DRG: 948 | End: 2018-01-19
Attending: EMERGENCY MEDICINE | Admitting: INTERNAL MEDICINE
Payer: MEDICARE

## 2018-01-19 ENCOUNTER — APPOINTMENT (EMERGENCY)
Dept: RADIOLOGY | Facility: HOSPITAL | Age: 83
DRG: 948 | End: 2018-01-19
Payer: MEDICARE

## 2018-01-19 VITALS
TEMPERATURE: 97.7 F | WEIGHT: 132.28 LBS | RESPIRATION RATE: 20 BRPM | SYSTOLIC BLOOD PRESSURE: 139 MMHG | OXYGEN SATURATION: 96 % | BODY MASS INDEX: 22.01 KG/M2 | HEART RATE: 96 BPM | DIASTOLIC BLOOD PRESSURE: 63 MMHG

## 2018-01-19 DIAGNOSIS — E86.0 DEHYDRATION: ICD-10-CM

## 2018-01-19 DIAGNOSIS — R53.1 WEAKNESS: ICD-10-CM

## 2018-01-19 DIAGNOSIS — R41.0 DELIRIUM: Primary | ICD-10-CM

## 2018-01-19 PROBLEM — R79.89 ELEVATED LACTIC ACID LEVEL: Status: ACTIVE | Noted: 2018-01-19

## 2018-01-19 LAB
ALBUMIN SERPL BCP-MCNC: 2 G/DL (ref 3.5–5)
ALP SERPL-CCNC: 72 U/L (ref 46–116)
ALT SERPL W P-5'-P-CCNC: 12 U/L (ref 12–78)
ANION GAP SERPL CALCULATED.3IONS-SCNC: 9 MMOL/L (ref 4–13)
ANISOCYTOSIS BLD QL SMEAR: PRESENT
APTT PPP: 65 SECONDS (ref 23–35)
AST SERPL W P-5'-P-CCNC: 12 U/L (ref 5–45)
ATRIAL RATE: 103 BPM
BACTERIA UR QL AUTO: ABNORMAL /HPF
BASOPHILS # BLD MANUAL: 0 THOUSAND/UL (ref 0–0.1)
BASOPHILS NFR MAR MANUAL: 0 % (ref 0–1)
BILIRUB DIRECT SERPL-MCNC: 0.34 MG/DL (ref 0–0.2)
BILIRUB SERPL-MCNC: 0.9 MG/DL (ref 0.2–1)
BILIRUB UR QL STRIP: NEGATIVE
BUN SERPL-MCNC: 27 MG/DL (ref 5–25)
CALCIUM SERPL-MCNC: 8.2 MG/DL (ref 8.3–10.1)
CHLORIDE SERPL-SCNC: 105 MMOL/L (ref 100–108)
CLARITY UR: CLEAR
CO2 SERPL-SCNC: 24 MMOL/L (ref 21–32)
COLOR UR: YELLOW
CREAT SERPL-MCNC: 1.07 MG/DL (ref 0.6–1.3)
EOSINOPHIL # BLD MANUAL: 0.52 THOUSAND/UL (ref 0–0.4)
EOSINOPHIL NFR BLD MANUAL: 5 % (ref 0–6)
ERYTHROCYTE [DISTWIDTH] IN BLOOD BY AUTOMATED COUNT: 15.7 % (ref 11.6–15.1)
GFR SERPL CREATININE-BSD FRML MDRD: 44 ML/MIN/1.73SQ M
GLUCOSE SERPL-MCNC: 186 MG/DL (ref 65–140)
GLUCOSE UR STRIP-MCNC: NEGATIVE MG/DL
HCT VFR BLD AUTO: 28.1 % (ref 34.8–46.1)
HGB BLD-MCNC: 9.2 G/DL (ref 11.5–15.4)
HGB UR QL STRIP.AUTO: ABNORMAL
INR PPP: 1.55 (ref 0.86–1.16)
KETONES UR STRIP-MCNC: NEGATIVE MG/DL
LACTATE SERPL-SCNC: 1.6 MMOL/L (ref 0.5–2)
LACTATE SERPL-SCNC: 2.1 MMOL/L (ref 0.5–2)
LEUKOCYTE ESTERASE UR QL STRIP: NEGATIVE
LG PLATELETS BLD QL SMEAR: PRESENT
LIPASE SERPL-CCNC: 273 U/L (ref 73–393)
LYMPHOCYTES # BLD AUTO: 0.31 THOUSAND/UL (ref 0.6–4.47)
LYMPHOCYTES # BLD AUTO: 3 % (ref 14–44)
MCH RBC QN AUTO: 26.7 PG (ref 26.8–34.3)
MCHC RBC AUTO-ENTMCNC: 32.7 G/DL (ref 31.4–37.4)
MCV RBC AUTO: 82 FL (ref 82–98)
MONOCYTES # BLD AUTO: 1.89 THOUSAND/UL (ref 0–1.22)
MONOCYTES NFR BLD: 18 % (ref 4–12)
NEUTROPHILS # BLD MANUAL: 7.76 THOUSAND/UL (ref 1.85–7.62)
NEUTS SEG NFR BLD AUTO: 74 % (ref 43–75)
NITRITE UR QL STRIP: NEGATIVE
NON-SQ EPI CELLS URNS QL MICRO: ABNORMAL /HPF
NT-PROBNP SERPL-MCNC: 4508 PG/ML
P AXIS: 59 DEGREES
PH UR STRIP.AUTO: 6 [PH] (ref 4.5–8)
PLATELET # BLD AUTO: 303 THOUSANDS/UL (ref 149–390)
PLATELET BLD QL SMEAR: ADEQUATE
PMV BLD AUTO: 10 FL (ref 8.9–12.7)
POTASSIUM SERPL-SCNC: 4.4 MMOL/L (ref 3.5–5.3)
PR INTERVAL: 148 MS
PROT SERPL-MCNC: 5.6 G/DL (ref 6.4–8.2)
PROT UR STRIP-MCNC: ABNORMAL MG/DL
PROTHROMBIN TIME: 19.1 SECONDS (ref 12.1–14.4)
QRS AXIS: 9 DEGREES
QRSD INTERVAL: 80 MS
QT INTERVAL: 312 MS
QTC INTERVAL: 408 MS
RBC # BLD AUTO: 3.44 MILLION/UL (ref 3.81–5.12)
RBC #/AREA URNS AUTO: ABNORMAL /HPF
SODIUM SERPL-SCNC: 138 MMOL/L (ref 136–145)
SP GR UR STRIP.AUTO: 1.02 (ref 1–1.03)
T WAVE AXIS: 72 DEGREES
TOTAL CELLS COUNTED SPEC: 100
TROPONIN I SERPL-MCNC: 0.05 NG/ML
TSH SERPL DL<=0.05 MIU/L-ACNC: 1.21 UIU/ML (ref 0.36–3.74)
UROBILINOGEN UR QL STRIP.AUTO: 0.2 E.U./DL
VENTRICULAR RATE: 103 BPM
WBC # BLD AUTO: 10.49 THOUSAND/UL (ref 4.31–10.16)
WBC #/AREA URNS AUTO: ABNORMAL /HPF

## 2018-01-19 PROCEDURE — 36415 COLL VENOUS BLD VENIPUNCTURE: CPT | Performed by: EMERGENCY MEDICINE

## 2018-01-19 PROCEDURE — 71046 X-RAY EXAM CHEST 2 VIEWS: CPT

## 2018-01-19 PROCEDURE — 87086 URINE CULTURE/COLONY COUNT: CPT | Performed by: EMERGENCY MEDICINE

## 2018-01-19 PROCEDURE — 83690 ASSAY OF LIPASE: CPT | Performed by: EMERGENCY MEDICINE

## 2018-01-19 PROCEDURE — 96361 HYDRATE IV INFUSION ADD-ON: CPT

## 2018-01-19 PROCEDURE — 85730 THROMBOPLASTIN TIME PARTIAL: CPT | Performed by: EMERGENCY MEDICINE

## 2018-01-19 PROCEDURE — 80048 BASIC METABOLIC PNL TOTAL CA: CPT | Performed by: EMERGENCY MEDICINE

## 2018-01-19 PROCEDURE — 80076 HEPATIC FUNCTION PANEL: CPT | Performed by: EMERGENCY MEDICINE

## 2018-01-19 PROCEDURE — 85610 PROTHROMBIN TIME: CPT | Performed by: EMERGENCY MEDICINE

## 2018-01-19 PROCEDURE — 93005 ELECTROCARDIOGRAM TRACING: CPT

## 2018-01-19 PROCEDURE — 87040 BLOOD CULTURE FOR BACTERIA: CPT | Performed by: EMERGENCY MEDICINE

## 2018-01-19 PROCEDURE — 87147 CULTURE TYPE IMMUNOLOGIC: CPT | Performed by: EMERGENCY MEDICINE

## 2018-01-19 PROCEDURE — 85027 COMPLETE CBC AUTOMATED: CPT | Performed by: EMERGENCY MEDICINE

## 2018-01-19 PROCEDURE — 81001 URINALYSIS AUTO W/SCOPE: CPT | Performed by: EMERGENCY MEDICINE

## 2018-01-19 PROCEDURE — 85007 BL SMEAR W/DIFF WBC COUNT: CPT | Performed by: EMERGENCY MEDICINE

## 2018-01-19 PROCEDURE — 99285 EMERGENCY DEPT VISIT HI MDM: CPT

## 2018-01-19 PROCEDURE — 84443 ASSAY THYROID STIM HORMONE: CPT | Performed by: EMERGENCY MEDICINE

## 2018-01-19 PROCEDURE — 83605 ASSAY OF LACTIC ACID: CPT | Performed by: EMERGENCY MEDICINE

## 2018-01-19 PROCEDURE — 96374 THER/PROPH/DIAG INJ IV PUSH: CPT

## 2018-01-19 PROCEDURE — 93005 ELECTROCARDIOGRAM TRACING: CPT | Performed by: EMERGENCY MEDICINE

## 2018-01-19 PROCEDURE — 83880 ASSAY OF NATRIURETIC PEPTIDE: CPT | Performed by: EMERGENCY MEDICINE

## 2018-01-19 PROCEDURE — 84484 ASSAY OF TROPONIN QUANT: CPT | Performed by: EMERGENCY MEDICINE

## 2018-01-19 RX ORDER — TRAMADOL HYDROCHLORIDE 50 MG/1
50 TABLET ORAL EVERY 6 HOURS PRN
Status: DISCONTINUED | OUTPATIENT
Start: 2018-01-19 | End: 2018-01-19 | Stop reason: HOSPADM

## 2018-01-19 RX ADMIN — CEFEPIME HYDROCHLORIDE 2000 MG: 2 INJECTION, POWDER, FOR SOLUTION INTRAVENOUS at 12:33

## 2018-01-19 RX ADMIN — TRAMADOL HYDROCHLORIDE 50 MG: 50 TABLET, FILM COATED ORAL at 13:09

## 2018-01-19 RX ADMIN — SODIUM CHLORIDE 1000 ML: 0.9 INJECTION, SOLUTION INTRAVENOUS at 10:47

## 2018-01-19 NOTE — ED PROVIDER NOTES
History  Chief Complaint   Patient presents with    Shortness of Breath     from [facility] via EMS with low sats on RA, Tachycardic, elevated temp  PT given 500ml IVF       History provided by:  Patient and EMS personnel  Malaise - 7 years or greater   Severity:  Moderate  Onset quality:  Gradual  Duration:  1 day  Timing:  Constant  Progression:  Worsening  Chronicity:  New  Context comment:  Just discharged from hospital after mechanical fall from standing resulting in a hip fracture, since being at nursing home decreased mental status weakness, complaining of shortness of breath  Relieved by:  Nothing  Worsened by:  Nothing  Ineffective treatments:  None tried  Associated symptoms: no abdominal pain, no chest pain, no cough, no diarrhea, no dizziness, no dysuria, no fever, no frequency, no headaches, no nausea, no shortness of breath and no vomiting        Prior to Admission Medications   Prescriptions Last Dose Informant Patient Reported? Taking?    MELATONIN PO   Yes Yes   Sig: Take 10 mg by mouth daily at bedtime   acetaminophen (TYLENOL) 325 mg tablet   No Yes   Sig: Take 3 tablets by mouth 3 (three) times a day   citalopram (CeleXA) 20 mg tablet   Yes Yes   Sig: Take 20 mg by mouth daily   dicyclomine (BENTYL) 20 mg tablet   No Yes   Sig: Take 1 tablet by mouth 2 (two) times a day as needed (Abdominal pain/cramping)   enoxaparin (LOVENOX) 40 mg/0 4 mL   No Yes   Sig: Inject 0 3 mL under the skin daily   levothyroxine 25 mcg tablet   Yes Yes   Sig: Take 25 mcg by mouth daily   mineral oil enema   Yes Yes   Sig: Insert 1 enema into the rectum every other day as needed for constipation   omeprazole (PriLOSEC) 40 MG capsule   Yes Yes   Sig: Take 40 mg by mouth daily   ondansetron (ZOFRAN-ODT) 4 mg disintegrating tablet   Yes Yes   Sig: Take 4 mg by mouth every 8 (eight) hours as needed for nausea or vomiting   polyethylene glycol (MIRALAX) 17 g packet   Yes Yes   Sig: Take 17 g by mouth daily   polyvinyl alcohol (LIQUIFILM TEARS) 1 4 % ophthalmic solution   Yes Yes   Sig: Administer 1 drop to both eyes 3 (three) times a day   senna-docusate sodium (SENOKOT-S) 8 6-50 mg per tablet   Yes Yes   Sig: Take 1 tablet by mouth daily   sucralfate (CARAFATE) 1 g tablet   Yes Yes   Sig: Take 1 g by mouth 4 (four) times a day Half hour before meals and bedtime   traMADol (ULTRAM) 50 mg tablet   No Yes   Sig: Take 1 tablet by mouth every 8 (eight) hours as needed for moderate pain or severe pain for up to 10 days      Facility-Administered Medications: None       Past Medical History:   Diagnosis Date    Anxiety     Depression     GERD (gastroesophageal reflux disease)     Lumbago     Osteoporosis     Thrombocytopenia ([address] )        Past Surgical History:   Procedure Laterality Date    OH PARTIAL HIP REPLACEMENT Right 1/16/2018    Procedure: Right Hip Hemiarthroplasty;  Surgeon: Milton Shirley DO;  Location: [facility];  Service: Orthopedics       History reviewed  No pertinent family history  I have reviewed and agree with the history as documented  Social History   Substance Use Topics    Smoking status: Never Smoker    Smokeless tobacco: Never Used    Alcohol use No        Review of Systems   Constitutional: Negative for activity change, chills, diaphoresis and fever  HENT: Negative for congestion, sinus pressure and sore throat  Eyes: Negative for pain and visual disturbance  Respiratory: Negative for cough, chest tightness, shortness of breath, wheezing and stridor  Cardiovascular: Negative for chest pain and palpitations  Gastrointestinal: Negative for abdominal distention, abdominal pain, constipation, diarrhea, nausea and vomiting  Genitourinary: Negative for dysuria and frequency  Musculoskeletal: Negative for neck pain and neck stiffness  Skin: Negative for rash  Neurological: Negative for dizziness, speech difficulty, light-headedness, numbness and headaches         Physical Exam  ED Triage Vitals [01/19/18 1036]   Temperature Pulse Respirations Blood Pressure SpO2   97 7 °F (36 5 °C) 104 20 139/63 93 %      Temp Source Heart Rate Source Patient Position - Orthostatic VS BP Location FiO2 (%)   Oral Monitor Lying Right arm --      Pain Score       No Pain           Orthostatic Vital Signs  Vitals:    01/19/18 1036 01/19/18 1045 01/19/18 1100   BP: 139/63 139/63    Pulse: 104 100 96   Patient Position - Orthostatic VS: Lying         Physical Exam   Constitutional: She is oriented to person, place, and time  She appears well-developed  No distress  HENT:   Head: Normocephalic and atraumatic  Eyes: Pupils are equal, round, and reactive to light  Neck: Normal range of motion  Neck supple  No tracheal deviation present  Cardiovascular: Normal rate, regular rhythm, normal heart sounds and intact distal pulses  No murmur heard  Pulmonary/Chest: Effort normal and breath sounds normal  No stridor  No respiratory distress  Abdominal: Soft  She exhibits no distension  There is no tenderness  There is no rebound and no guarding  Musculoskeletal: Normal range of motion  Neurological: She is alert and oriented to person, place, and time  Skin: Skin is warm and dry  She is not diaphoretic  No erythema  No pallor  Psychiatric: She has a normal mood and affect  Vitals reviewed  ED Medications  Medications   traMADol (ULTRAM) tablet 50 mg (50 mg Oral Given 1/19/18 1309)   sodium chloride 0 9 % bolus 1,000 mL (0 mL Intravenous Stopped 1/19/18 1156)   cefepime (MAXIPIME) 2 g/50 mL dextrose IVPB (0 mg Intravenous Stopped 1/19/18 1453)       Diagnostic Studies  Results Reviewed     Procedure Component Value Units Date/Time    Lactic acid, plasma [[id number]]  (Normal) Collected:  01/19/18 1230    Lab Status:  Final result Specimen:  Blood from Arm, Left Updated:  01/19/18 1259     LACTIC ACID 1 6 mmol/L     Narrative:         Result may be elevated if tourniquet was used during collection  Urine culture [[id number]]     Lab Status:  No result Specimen:  Urine from Urine, Straight Cath     Urine Microscopic [[id number]]  (Abnormal) Collected:  01/19/18 1156    Lab Status:  Final result Specimen:  Urine from Urine, Straight Cath Updated:  01/19/18 1217     RBC, UA 0-1 (A) /hpf      WBC, UA 0-1 (A) /hpf      Epithelial Cells Occasional /hpf      Bacteria, UA Occasional /hpf     UA w Reflex to Microscopic w Reflex to Culture [[id number]]  (Abnormal) Collected:  01/19/18 1156    Lab Status:  Final result Specimen:  Urine from Urine, Straight Cath Updated:  01/19/18 1208     Color, UA Yellow     Clarity, UA Clear     Specific Gravity, UA 1 025     pH, UA 6 0     Leukocytes, UA Negative     Nitrite, UA Negative     Protein, UA 30 (1+) (A) mg/dl      Glucose, UA Negative mg/dl      Ketones, UA Negative mg/dl      Urobilinogen, UA 0 2 E U /dl      Bilirubin, UA Negative     Blood, UA Small (A)    CBC and differential [[id number]]  (Abnormal) Collected:  01/19/18 1050    Lab Status:  Final result Specimen:  Blood from Arm, Right Updated:  01/19/18 1145     WBC 10 49 (H) Thousand/uL      RBC 3 44 (L) Million/uL      Hemoglobin 9 2 (L) g/dL      Hematocrit 28 1 (L) %      MCV 82 fL      MCH 26 7 (L) pg      MCHC 32 7 g/dL      RDW 15 7 (H) %      MPV 10 0 fL      Platelets 469 Thousands/uL     Lactic acid, plasma [[id number]]  (Abnormal) Collected:  01/19/18 1057    Lab Status:  Final result Specimen:  Blood Updated:  01/19/18 1130     LACTIC ACID 2 1 (HH) mmol/L     Narrative:         Result may be elevated if tourniquet was used during collection  TSH [[id number]]  (Normal) Collected:  01/19/18 1050    Lab Status:  Final result Specimen:  Blood from Arm, Right Updated:  01/19/18 1128     TSH 3RD GENERATON 1 211 uIU/mL     Narrative:         Patients undergoing fluorescein dye angiography may retain small amounts of fluorescein in the body for 48-72 hours post procedure   Samples containing fluorescein can produce falsely depressed TSH values  If the patient had this procedure,a specimen should be resubmitted post fluorescein clearance  The recommended reference ranges for TSH during pregnancy are as follows:  First trimester 0 1 to 2 5 uIU/mL  Second trimester  0 2 to 3 0 uIU/mL  Third trimester 0 3 to 3 0 uIU/m      Hepatic function panel [[id number]]  (Abnormal) Collected:  01/19/18 1050    Lab Status:  Final result Specimen:  Blood from Arm, Right Updated:  01/19/18 1128     Total Bilirubin 0 90 mg/dL      Bilirubin, Direct 0 34 (H) mg/dL      Alkaline Phosphatase 72 U/L      AST 12 U/L      ALT 12 U/L      Total Protein 5 6 (L) g/dL      Albumin 2 0 (L) g/dL     Lipase [[id number]]  (Normal) Collected:  01/19/18 1050    Lab Status:  Final result Specimen:  Blood from Arm, Right Updated:  01/19/18 1128     Lipase 273 u/L     B-type natriuretic peptide [[id number]]  (Abnormal) Collected:  01/19/18 1050    Lab Status:  Final result Specimen:  Blood from Arm, Right Updated:  01/19/18 1128     NT-proBNP 4,508 (H) pg/mL     Blood culture #2 [[id number]] Collected:  01/19/18 1119    Lab Status: In process Specimen:  Blood from Arm, Left Updated:  01/19/18 7652    Basic metabolic panel [[id number]]  (Abnormal) Collected:  01/19/18 1050    Lab Status:  Final result Specimen:  Blood from Arm, Right Updated:  01/19/18 1122     Sodium 138 mmol/L      Potassium 4 4 mmol/L      Chloride 105 mmol/L      CO2 24 mmol/L      Anion Gap 9 mmol/L      BUN 27 (H) mg/dL      Creatinine 1 07 mg/dL      Glucose 186 (H) mg/dL      Calcium 8 2 (L) mg/dL      eGFR 44 ml/min/1 73sq m     Narrative:         National Kidney Disease Education Program recommendations are as follows:  GFR calculation is accurate only with a steady state creatinine  Chronic Kidney disease less than 60 ml/min/1 73 sq  meters  Kidney failure less than 15 ml/min/1 73 sq  meters      Troponin I [[id number]]  (Abnormal) Collected:  01/19/18 1050    Lab Status:  Final result Specimen:  Blood from Arm, Right Updated:  01/19/18 1121     Troponin I 0 05 (H) ng/mL     Narrative:         [employer] Chemistry analyzer 99% cutoff is > 0 04 ng/mL in network labs    o cTnI 99% cutoff is useful only when applied to patients in the clinical setting of myocardial ischemia  o cTnI 99% cutoff should be interpreted in the context of clinical history, ECG findings and possibly cardiac imaging to establish correct diagnosis  o cTnI 99% cutoff may be suggestive but clearly not indicative of a coronary event without the clinical setting of myocardial ischemia  Protime-INR [[id number]]  (Abnormal) Collected:  01/19/18 1050    Lab Status:  Final result Specimen:  Blood from Arm, Right Updated:  01/19/18 1118     Protime 19 1 (H) seconds      INR 1 55 (H)    APTT [[id number]]  (Abnormal) Collected:  01/19/18 1050    Lab Status:  Final result Specimen:  Blood from Arm, Right Updated:  01/19/18 1118     PTT 65 (H) seconds     Narrative: Therapeutic Heparin Range = 60-90 seconds    Blood culture #1 [[id number]] Collected:  01/19/18 1051    Lab Status: In process Specimen:  Blood from Arm, Right Updated:  01/19/18 1057                 XR chest 2 views   ED Interpretation by Byron Mccann DO (01/19 1217)   Questionable early right lower/middle lobe infiltrate      Final Result by Gabrielle Watters MD (01/19 1300)      No active pulmonary disease           Workstation performed: [id number]                    Procedures  ECG 12 Lead Documentation  Date/Time: 1/19/2018 12:14 PM  Performed by: Brianna Chambers by: Tim Pedersen     ECG reviewed by me, the ED Provider: yes    Patient location:  ED  Previous ECG:     Previous ECG:  Compared to current    Comparison ECG info:  1 14 2018    Similarity:  No change  Interpretation:     Interpretation: non-specific    Rate:     ECG rate:  103    ECG rate assessment: tachycardic    Rhythm:     Rhythm: sinus rhythm    Ectopy:     Ectopy: PAC    QRS:     QRS axis:  Left    QRS intervals:  Normal  Conduction:     Conduction: normal    ST segments:     ST segments:  Non-specific  T waves:     T waves: non-specific             Phone Contacts  ED Phone Contact    ED Course  ED Course as of Jan 19 1459 Fri Jan 19, 2018   1456 No etiology found  for patient's positive sepsis criteria, UA negative, x-ray unremarkable Patient initially to be admitted to Internal Medicine  , Internal Medicine team saw patient down here, knows patient well as she was just discharged from their service, explained that this is the exact condition that she was in yesterday, and that there is no new or acute pathology, lactic acid steadily decreasing, likely a degree of dehydration  Internal medicine requesting discharge at this time, they discussed case with patient's family members                           Initial Sepsis Screening     [address] Name 01/19/18 1211                Is the patient's history suggestive of a new or worsening infection? (!)  Yes (Proceed)  -DA        Suspected source of infection suspect infection, source unknown  -DA        Are two or more of the following signs & symptoms of infection both present and new to the patient? (!)  Yes (Proceed)  -DA        Indicate SIRS criteria Altered mental status; Tachycardia > 90 bpm  -DA        If the answer is yes to both questions, suspicion of sepsis is present          If severe sepsis is present AND tissue hypoperfusion perists in the hour after fluid resuscitation or lactate > 4, the patient meets criteria for SEPTIC SHOCK          Are any of the following organ dysfunction criteria present within 6 hours of suspected infection and SIRS criteria that are NOT considered to be chronic conditions?  (!)  Yes  -DA        Organ dysfunction Lactate > 2 0 mmol/L  -DA        Date of presentation of severe sepsis 01/19/18  -DA        Time of presentation of severe sepsis 1211  -DA        Tissue hypoperfusion persists in the hour after crystalloid fluid administration, evidenced, by either:          Was hypotension present within one hour of the conclusion of crystalloid fluid administration?           Date of presentation of septic shock          Time of presentation of septic shock            User Key  (r) = Recorded By, (t) = Taken By, (c) = Cosigned By    Initials Name Provider Type    LIAM Morales DO Physician                  MDM  Number of Diagnoses or Management Options  Delirium: new and requires workup  Severe sepsis [facility]): new and requires workup  Diagnosis management comments: 26-year-old female increasing shortness of breath, tachycardic, recent admission      Initial DDx includes but is not limited to:   Pneumonia, urinary tract infection, sepsis, pulmonary embolism, ACS, fluid overload      Initial ED Plan:   Blood work, chest x-ray , disposition pending ED workup       Amount and/or Complexity of Data Reviewed  Clinical lab tests: reviewed and ordered  Tests in the radiology section of CPT®: ordered and reviewed  Decide to obtain previous medical records or to obtain history from someone other than the patient: yes  Obtain history from someone other than the patient: yes  Review and summarize past medical records: yes  Independent visualization of images, tracings, or specimens: yes      CritCare Time    Disposition  Final diagnoses:   Delirium   Weakness   Dehydration     Time reflects when diagnosis was documented in both MDM as applicable and the Disposition within this note     Time User Action Codes Description Comment    1/19/2018 12:39 PM Starlette Pennant Add [A41 9,  R65 20] Severe sepsis ([facility] [address] )     1/19/2018 12:39 PM Vj LE Add [R41 0] Delirium     1/19/2018  2:56 PM Jose Ulloa Add [R53 1] Weakness     1/19/2018  2:58 PM Vj LE Modify [R41 0] Delirium     1/19/2018  2:58 PM Jose Ulloa Remove [A41 9,  R65 20] Severe sepsis ([facility] [address] )     1/19/2018  2:58 PM Starlette Pennant Add [E86 0] Dehydration       ED Disposition     ED Disposition Condition Comment    Discharge  [address] discharge to home/self care  Condition at discharge: [address]    None       Patient's Medications   Discharge Prescriptions    No medications on file     No discharge procedures on file      ED Provider  Electronically Signed by           Jv Loera, DO  01/19/18 2056 [facility], DO  01/19/18 4009

## 2018-01-19 NOTE — Clinical Note
[address] discharge to home/self care      Condition at discharge: [address] [facility]

## 2018-01-19 NOTE — ASSESSMENT & PLAN NOTE
· Patient underwent right hemiarthroplasty on January 16th and was deemed to be stable by orthopedic service for discharge  · At time of discharge the tramadol and Tylenol were affective means of pain control to reduce pain to a level where she was up not overly lethargic but also not in excruciating pain    · Certainly postoperative course in a 51-year-old woman with dementia is guarded and recovery is not expected to be immediate  · The mild tachycardia noted in the low 100 range was also present during her hospitalization and is likely a result of pain, anxiety, and possibly the mild blood loss anemia, though that has remained stable

## 2018-01-19 NOTE — CONSULTS
Consult- [address] 7/14/1922, 80 y o  female MRN: [id number]    Unit/Bed#: ED 27 Encounter: [id number]    Primary Care Provider: No primary care provider on file  Date and time admitted to hospital: 1/19/2018 10:29 AM  Consult to Internal Medicine  Consult performed by: Romulo Reis ordered by: Kiah Catalan        Closed displaced fracture of right femoral neck [facility])   Assessment & Plan    · Patient underwent right hemiarthroplasty on January 16th and was deemed to be stable by orthopedic service for discharge  · At time of discharge the tramadol and Tylenol were affective means of pain control to reduce pain to a level where she was up not overly lethargic but also not in excruciating pain  · Certainly postoperative course in a 45-year-old woman with dementia is guarded and recovery is not expected to be immediate  · The mild tachycardia noted in the low 100 range was also present during her hospitalization and is likely a result of pain, anxiety, and possibly the mild blood loss anemia, though that has remained stable        Dementia   Assessment & Plan    · Patient with underlying dementia who was already previously a resident of nursing home for 5 years  · Is absolutely expected in her situation that she will likely have some element of acute delirium related to medication for pain and change in her environment  The best management for this is to keep her in a familiar environment and continue to reorient her  In addition we recommend conservative pain management and adequate nutrition, hydration, and sleep  · Because patient does not have the mental capacity to fully understand how to utilize the incentive spirometer, we anticipate that some level of atelectasis will contribute to mild hypoxia  However, it is noted that her O2 sat in the ambulance on the way to the hospital today with 92% on room air and she is currently 95% on room air          Ambulatory dysfunction   Assessment & Plan    · Patient was seen by physical therapy on January 18th  At that time, bed mobility was performed and but patient was not able to ambulate given her frail state and pain at that time  In speaking both with the physical therapist who treated her yesterday and with the manager of physical therapy, it is absolutely reasonable to expect her to have a very slow recovery time because of her age, frailty, and dementia  They do not feel that patient will be immediately ambulatory and feel it could take weeks for her to get to that point  It was also felt that she will receive more physical and occupational therapy and rehab at her nursing home facility than she would be able to have during an acute care hospitalization and as such, we have advocated for her return back to the facility  Murmur   Assessment & Plan    · Patient with very loud systolic ejection murmur which very well could represent severe aortic stenosis  I had already discussed this with the family during her hospitalization and at that time it was decided that we would not pursue any heart testing at her age given that we would not likely intervene with any invasive heart surgery  · The elevated BNP and very mild troponin elevation noted today in the emergency department can certainly be explained by this however and I do not feel patient is acutely decompensated  She has no lung rales, no symmetric edema, and her O2 sat is 95% on room air  Chest x-ray done in the emergency department today was normal         Elevated lactic acid level   Assessment & Plan    · Lactic acid in the emergency department noted to be mildly elevated at 2 1  Independent of any other signs and symptoms consistent with sepsis, this finding is not relevant  I would not continue any additional antibiotics at this time              VTE Prophylaxis: Enoxaparin (Lovenox)     Recommendations for Discharge:  · Recommend discharge from the emergency department back to [facility] for patient to have rehab with physical and occupational therapy  She can continue oral pain regimen including Tylenol and tramadol  She can continue Lovenox for DVT prophylaxis to complete the 28 day course postop  She should follow up with her orthopedic physician within 1-2 weeks  If she becomes mildly hypoxic with O2 sat less than 89 percent on room air due to atelectasis we would recommend utilizing oxygen and encouraging incentive spirometry as tolerated  Counseling / Coordination of Care Time: 2 hours  Greater than 50% of total time spent on patient counseling and coordination of care  Collaboration of Care: Were Recommendations Directly Discussed with Primary Treatment Team? - Yes     History of Present Illness: Vadim Paz is a 80 y o  female who was brought into the emergency department on 1/19/2018, sent in from [address] [place] [address] for "tachypnea and tachycardia"  She had just been discharged there yesterday after having hip surgery for right hip fracture on January 16th  According to the notes from the EMS she was sent in with a temperature of 99°, tachycardia in low 100 range, O2 sat 92% on room air normal blood pressure of 133/63 and blood glucose 205  Upon arrival here our vital signs included temperature 97 7°, pulse 104, respiratory rate 20, blood pressure 139/63, and O2 sat 93% on room air  For the duration of her time in the emergency department she reported absolutely no chest pain or shortness of breath and was not noted to have any tachypnea, cough, wheezing, or respiratory distress  She was obviously uncomfortable and did require a tramadol and repositioning in bed  She has dementia and was repeatedly asking how she got here and could she just go back to her room      After doing a thorough medical evaluation we had determined that there was no indication for a hospital readmission and advocated return to the nursing home for additional rehab as already planned  This was discussed with the emergency department and they were completely in agreement  We reached out to the patient's daughter-in-law to review the plan  At that time she expressed some concerns about care provided at [facility] and indicated that she wanted one-to-one supervision  I asked our case management team to speak with her directly and they notified her that one-to-one supervision is not available but [facility] is able to provide nursing, nurses aides, physical and occupational therapy as had been planned  Patient's daughter in law then indicated she did not feel patient should have been discharged yesterday since she had not been ambulatory and wanted her to remain hospitalized until she was ambulatory  At that point I reached out to the physical therapist who had treated her yesterday and we subsequently escalated it to the manager of physical therapy  They both felt that it was in on reasonable goal to expect a frail elderly demented 29-year-old female to be immediately ambulatory after a surgery like this and felt it was in the patient's best interest to return to [facility] where she could have more rehabilitation then she could in the hospital setting given that she was deemed to be medically stable  Patient's daughter also reached out to our  and I subsequently had a conversation with her at which point we reviewed what we felt would be the best plan for the patient and the daughter in law was directly notified of this plan    Throughout her time in the emergency department I checked back on her on several occasions  She appeared to be more comfortable once she was repositioned and had no point appeared clinically unstable       Review of Systems:    Review of Systems   Unable to perform ROS: Dementia       Past Medical and Surgical History:     Past Medical History:   Diagnosis Date    Anxiety     Depression     GERD (gastroesophageal reflux disease)  Lumbago     Osteoporosis     Thrombocytopenia ([facility] [address] )        Past Surgical History:   Procedure Laterality Date    SD PARTIAL HIP REPLACEMENT Right 1/16/2018    Procedure: Right Hip Hemiarthroplasty;  Surgeon: Cristina Patel DO;  Location: [facility];  Service: Orthopedics       Meds/Allergies:    all medications and allergies reviewed    Allergies: No Known Allergies    Social History:     Marital Status:     Substance Use History:   History   Alcohol Use No     History   Smoking Status    Never Smoker   Smokeless Tobacco    Never Used     History   Drug Use No       Family History:    non-contributory    Physical Exam:     Vitals:   Blood Pressure: 139/63 (01/19/18 1045)  Pulse: 96 (01/19/18 1100)  Temperature: 97 7 °F (36 5 °C) (01/19/18 1036)  Temp Source: Oral (01/19/18 1036)  Respirations: 20 (01/19/18 1036)  Weight - Scale: 60 kg (132 lb 4 4 oz) (01/19/18 1036)  SpO2: 96 % (01/19/18 1100)    Physical Exam   Constitutional: No distress  Patient is very thin frail elderly female who appears somewhat uncomfortable but certainly not in any severe distress  HENT:   Head: Normocephalic and atraumatic  Eyes: Conjunctivae are normal  Right eye exhibits no discharge  Left eye exhibits no discharge  No scleral icterus  Neck: Neck supple  JVD present  Cardiovascular: Normal rate and regular rhythm  Murmur (Very loud systolic ejection murmur) heard  Pulmonary/Chest: No stridor  No respiratory distress  She has no wheezes  She has no rales  Most recent O2 sat noted to be 95% on room air  She has no cough or wheezing  Abdominal: Soft  She exhibits no distension  There is no tenderness  Musculoskeletal: She exhibits edema (Right thigh edema )  Neurological: She is alert  Patient is awake alert and appropriately interactive and is able to maintain alertness through the entirety of my conversation  She attempts to follow commands appropriately but cannot always do so   We attempted to stand her in the room with the walker but she could not ambulate  She has no tremor or obvious focal weakness  She has obvious dementia and is pleasant but confused stating that she just wants to go back to her room and asking how she got here  She is not oriented to place or time  Skin: Skin is warm and dry  No rash noted  She is not diaphoretic  No erythema  No pallor  Psychiatric:   She is neither combative or aggressive   Nursing note and vitals reviewed  Additional Data:     Lab Results: I have personally reviewed pertinent reports  Results from last 7 days  Lab Units 01/19/18  1050  01/14/18  2042   WBC Thousand/uL 10 49*  < > 18 67*   HEMOGLOBIN g/dL 9 2*  < > 11 3*   HEMATOCRIT % 28 1*  < > 36 0   PLATELETS Thousands/uL 303  < > 381   NEUTROS PCT %  --   --  80*   LYMPHS PCT %  --   --  8*   LYMPHO PCT % 3*  < >  --    MONOS PCT %  --   --  11   MONO PCT MAN % 18*  < >  --    EOS PCT %  --   --  1   EOSINO PCT MANUAL % 5  < >  --    < > = values in this interval not displayed  Results from last 7 days  Lab Units 01/19/18  1050   SODIUM mmol/L 138   POTASSIUM mmol/L 4 4   CHLORIDE mmol/L 105   CO2 mmol/L 24   BUN mg/dL 27*   CREATININE mg/dL 1 07   CALCIUM mg/dL 8 2*   TOTAL PROTEIN g/dL 5 6*   BILIRUBIN TOTAL mg/dL 0 90   ALK PHOS U/L 72   ALT U/L 12   AST U/L 12   GLUCOSE RANDOM mg/dL 186*       Results from last 7 days  Lab Units 01/19/18  1050   INR  1 55*       Results from last 7 days  Lab Units 01/19/18  1050   TROPONIN I ng/mL 0 05*     Lab Results   Component Value Date/Time    HGBA1C 6 2 (H) 09/17/2014 02:19 AM       Imaging: I have personally reviewed pertinent reports  XR chest 2 views   ED Interpretation by Geneva Epps DO (01/19 1217)   Questionable early right lower/middle lobe infiltrate      Final Result by Marnie Saint, MD (01/19 1300)      No active pulmonary disease           Workstation performed: [id number]             EKG, Pathology, and Other Studies Reviewed on Admission:   · EKG: sinus tachy 103    ** Please Note: This note has been constructed using a voice recognition system   **

## 2018-01-19 NOTE — SOCIAL WORK
CM received call from DALIA Estrada that Pt was back in the ED  Per Pt's daughter in law Daniela Meléndez was not properly caring for Pt  CM called Kev Correa in admissions at [facility] and per [facility] has been a resident since 2013 so she is staying in her room on the long term section but is receiving STR, they do not want to move patient to the short term section due to patient living in the long term section for so many years  Kev Correa confirms that there are nurses and aides around the clock to provide care and assist Pt  CM then spoke with Pt's daughter in law Alvin Has who reports that Pt was pulling her oxygen mask off and needs 1:1 care to stop preventing her from pulling oxygen mask off  CM explained that there are not staff to provide 1:1 care at nursing homes whether she is long term or short term and she would have to further discuss that with [facility] administration  Pt's daughter in law reports that Pt is not ambulating, CM read PT note from yesterday which said Pt did not ambulate due to pain, Pt's daughter in law stated she did not think Pt should have been d/c if she did not ambulate  CM explained Pt was going to rehab for them to provide therapy daily to build up strength  Pt's daughter in law reported she was wanted Pt admitted to the hospital and did not want her discharged until she was ambulating  CM asked Alvin Has to speak to DALIA Estrada regarding pain yesterday but Alvin Has reported she did not want to speak to her and was calling Paulette Mann

## 2018-01-19 NOTE — DISCHARGE INSTRUCTIONS
·   Dehydration   WHAT YOU NEED TO KNOW:   Dehydration is a condition that develops when your body does not have enough fluid  You may become dehydrated if you do not drink enough water or lose too much fluid  Fluid loss may also cause loss of electrolytes (minerals), such as sodium  DISCHARGE INSTRUCTIONS:   Return to the emergency department if:   You have a seizure  You are confused or cannot think clearly  You are extremely sleepy, or another person cannot wake you  You become dizzy or faint when you stand  You are not able to urinate  You have trouble breathing  You have a fast or irregular heartbeat  Your hands or feet are cold, or your face is pale  Contact your healthcare provider if:   You have trouble drinking liquids because you are vomiting  Your symptoms get worse  You have a fever  You feel very weak or tired  You have questions or concerns about your condition or care  Follow up with your healthcare provider as directed:  Write down your questions so you remember to ask them during your visits  Prevent or manage dehydration:   Drink liquids as directed  Liquids that contain water, sugar, and minerals can help your body hold in fluid and help prevent dehydration  Drink liquids throughout the day, not just when you feel thirsty  Men should drink about 3 liters (13 eight-ounce cups) of liquid each day  Women should drink about 2 liters (9 eight-ounce cups) of liquid each day  Drink even more liquid if you will be outdoors, in the sun for a long time, or exercising  Stay cool  Limit the time you spend outdoors during the hottest part of the day  Dress in lightweight clothes  Keep track of how often you urinate  If you urinate less than usual or your urine is darker, drink more liquids  © 2017 [address] Information is for End User's use only and may not be sold, redistributed or otherwise used for commercial purposes   All illustrations and images included in CareNotes® are the copyrighted property of [employer]  or [employer]  The above information is an  only  It is not intended as medical advice for individual conditions or treatments  Talk to your doctor, nurse or pharmacist before following any medical regimen to see if it is safe and effective for you  ·   Weakness   WHAT YOU NEED TO KNOW:   Weakness is a loss of muscle strength  It may be caused by brain, nerve, or muscle problems  Physical and mental conditions such as heart problems, pregnancy, dehydration, or depression may also cause weakness  Reactions to certain drugs can cause weakness  Parts of your body may become weak if you need to wear a cast or splint or have been on bed rest for a long time  DISCHARGE INSTRUCTIONS:   Call 911 for any of the following: You have any of the following signs of a stroke:      Numbness or drooping on one side of your face     Weakness in an arm or leg    Confusion or difficulty speaking    Dizziness, a severe headache, or vision loss    You lose feeling in your weakened body area  You have electric shock-like feelings down your arms and legs when you flex or move your neck  You have sudden or increased trouble speaking, swallowing, or breathing  Return to the emergency department if:   You have severe pain in your back, arms, or legs that worsens  You have sudden or worsened muscle weakness or loss of movement  You are not able to control when you urinate or have a bowel movement  Contact your healthcare provider if:   You feel depressed or anxious  You have questions or concerns about your condition or care  Manage weakness:   Use assistive devices as directed  These help protect you from injury  Examples include a walker or cane  Have someone install handrails in your home  These will help you get out of a bathtub or stand up from a toilet  Use a shower chair so you can sit while you shower  Sit down on the toilet or another chair to dry off and put on your clothes  Get help going up and down stairs if your legs are weak  Go to physical or occupational therapy if directed  A physical therapist can teach you exercises to help strengthen weak muscles  An occupational therapist can show you ways to do your daily activities more easily  For example, light forks and spoons can be easier to use if you have hand weakness  You may also learn ways to organize your household items so you are not moving heavy items  Balance rest with exercise  Exercise can help increase your muscle strength and energy  Do not exercise for long periods at a time  Take breaks often to rest  Too much exercise can cause muscle strain or make you more tired  Ask your healthcare provider how much exercise is right for you  Eat a variety of healthy foods  Too much or too little food may cause weakness or tiredness  Ask your healthcare provider what a healthy amount of food is for you  Healthy foods include fruits, vegetables, whole-grain breads, low-fat dairy products, lean meats and fish, nuts, and cooked beans  Do not smoke  Nicotine and other chemicals in cigarettes and cigars can make your symptoms worse, and can cause lung damage  Ask your healthcare provider for information if you currently smoke and need help to quit  E-cigarettes or smokeless tobacco still contain nicotine  Talk to your healthcare provider before you use these products  Do not use caffeine, alcohol, or illegal drugs  These may cause muscle twitching, which could lead to worsened weakness  Follow up with your healthcare provider as directed:  Write down your questions so you remember to ask them during your visits  © 2017 [address] Information is for End User's use only and may not be sold, redistributed or otherwise used for commercial purposes   All illustrations and images included in CareNotes® are the copyrighted property of [employer]  or [employer]  The above information is an  only  It is not intended as medical advice for individual conditions or treatments  Talk to your doctor, nurse or pharmacist before following any medical regimen to see if it is safe and effective for you  ·   Acute Delirium   WHAT YOU NEED TO KNOW:   Acute delirium is temporary confusion and change in consciousness  Consciousness is how alert and aware of your surroundings you are  You may have trouble remembering, listening, or doing things you usually do  Acute delirium may be caused by an illness, injury, surgery, medicine, or alcohol or drug use  DISCHARGE INSTRUCTIONS:   Call 911 for the following: You want to harm yourself or others  Return to the emergency department if:   You cannot eat or drink, and you feel weak or dizzy  You have questions or concerns about your condition or care  Contact your healthcare provider if:   You have trouble remembering  You have trouble sleeping  You are depressed  Medicines: You may need any of the following:  Antipsychotics  help you stop seeing or hearing things that are not there  Benzodiazepines  are used if your delirium occurs after you suddenly stop using drugs or drinking alcohol  Take your medicine as directed  Contact your healthcare provider if you think your medicine is not helping or if you have side effects  Tell him of her if you are allergic to any medicine  Keep a list of the medicines, vitamins, and herbs you take  Include the amounts, and when and why you take them  Bring the list or the pill bottles to follow-up visits  Carry your medicine list with you in case of an emergency  Follow up with your healthcare provider as directed:  Ask for help if you have a drug or alcohol problem  You may need several appointments to see if your treatment is working   Write down your questions so you remember to ask them during your visits  Self-care:   Talk to counselors  Healthcare providers will work with you to help you feel calm and talk about your thoughts and feelings  They will help you remember where you are  They will work to keep you and those around you safe  Talk to family and friends  Talk to those around you when you feel lonely or sad  Ask for help when you forget the time, place, or names of people around you  Change your surroundings  Keep your home or room quiet and comfortable  Surround yourself with familiar objects  Keep a calendar and clock nearby to remind you of the date and time  Keep pictures of your family and friends nearby  This will help you stay aware of yourself and the area around you  It may also help you feel safe and calm  © 2017 [address]  Information is for End User's use only and may not be sold, redistributed or otherwise used for commercial purposes  All illustrations and images included in CareNotes® are the copyrighted property of [employer] M [employer] Inc  or [employer]  The above information is an  only  It is not intended as medical advice for individual conditions or treatments  Talk to your doctor, nurse or pharmacist before following any medical regimen to see if it is safe and effective for you    · Please continue physical and occupational therapy at the appropriate pace given the patient's age and debility at [facility]  · Please complete a full 28 day course of Lovenox for DVT prophylaxis  · Continue medication for pain including Tylenol and tramadol for severe pain  · Recommend ongoing discussions regarding goals of care between the family and physician at [facility]  · Try to encourage incentive spirometry but provide oxygen if O2 sat is less than 89%

## 2018-01-19 NOTE — ASSESSMENT & PLAN NOTE
· Patient was seen by physical therapy on January 18th  At that time, bed mobility was performed and but patient was not able to ambulate given her frail state and pain at that time  In speaking both with the physical therapist who treated her yesterday and with the manager of physical therapy, it is absolutely reasonable to expect her to have a very slow recovery time because of her age, frailty, and dementia  They do not feel that the patient will be immediately ambulatory and feel it could take weeks for her to get to that point  It was also felt that she will receive more physical and occupational therapy and rehab at her nursing home facility than she would be able to have during an acute care hospitalization and as such, we have advocated for her return back to the facility

## 2018-01-19 NOTE — ASSESSMENT & PLAN NOTE
· Patient with underlying dementia who was already previously a resident of nursing home for 5 years  · Is absolutely expected in her situation that she will likely have some element of acute delirium related to medication for pain and change in her environment  The best management for this is to keep her in a familiar environment and continue to reorient her  In addition we recommend conservative pain management and adequate nutrition, hydration, and sleep  · Because patient does not have the mental capacity to fully understand how to utilize the incentive spirometer, we anticipate that some level of atelectasis will contribute to mild hypoxia  However, it is noted that her O2 sat in the ambulance on the way to the hospital today with 92% on room air and she is currently 95% on room air

## 2018-01-19 NOTE — ASSESSMENT & PLAN NOTE
· Lactic acid in the emergency department noted to be mildly elevated at 2 1  Independent of any other signs and symptoms consistent with sepsis, this finding is not relevant  I would not continue any additional antibiotics at this time

## 2018-01-19 NOTE — ASSESSMENT & PLAN NOTE
· Patient with very loud systolic ejection murmur which very well could represent severe aortic stenosis  I had already discussed this with the family during her hospitalization and at that time it was decided that we would not pursue any heart testing at her age given that we would not likely intervene with any invasive heart surgery  · The elevated BNP and very mild troponin elevation noted today in the emergency department can certainly be explained by this however and I do not feel patient is acutely decompensated  She has no lung rales, no symmetric edema, and her O2 sat is 95% on room air    Chest x-ray done in the emergency department today was normal

## 2018-01-20 ENCOUNTER — HOSPITAL ENCOUNTER (INPATIENT)
Facility: HOSPITAL | Age: 83
LOS: 4 days | Discharge: NURSING, REHAB OR LONG-TERM CARE | DRG: 948 | End: 2018-01-24
Attending: EMERGENCY MEDICINE | Admitting: INTERNAL MEDICINE
Payer: MEDICARE

## 2018-01-20 DIAGNOSIS — R78.81 POSITIVE BLOOD CULTURES: Primary | ICD-10-CM

## 2018-01-20 DIAGNOSIS — S72.001A CLOSED DISPLACED FRACTURE OF RIGHT FEMORAL NECK (HCC): ICD-10-CM

## 2018-01-20 DIAGNOSIS — J45.909 REACTIVE AIRWAY DISEASE: ICD-10-CM

## 2018-01-20 DIAGNOSIS — J45.909 ASTHMA: ICD-10-CM

## 2018-01-20 DIAGNOSIS — R78.81 POSITIVE BLOOD CULTURE: ICD-10-CM

## 2018-01-20 PROBLEM — R26.2 AMBULATORY DYSFUNCTION: Status: ACTIVE | Noted: 2018-01-14

## 2018-01-20 PROBLEM — G93.40 ENCEPHALOPATHY: Status: ACTIVE | Noted: 2018-01-20

## 2018-01-20 LAB
ALBUMIN SERPL BCP-MCNC: 2.1 G/DL (ref 3.5–5)
ALP SERPL-CCNC: 72 U/L (ref 46–116)
ALT SERPL W P-5'-P-CCNC: 13 U/L (ref 12–78)
ANION GAP SERPL CALCULATED.3IONS-SCNC: 7 MMOL/L (ref 4–13)
ANISOCYTOSIS BLD QL SMEAR: PRESENT
AST SERPL W P-5'-P-CCNC: 17 U/L (ref 5–45)
BACTERIA UR CULT: NORMAL
BASOPHILS # BLD MANUAL: 0 THOUSAND/UL (ref 0–0.1)
BASOPHILS NFR MAR MANUAL: 0 % (ref 0–1)
BILIRUB SERPL-MCNC: 1 MG/DL (ref 0.2–1)
BUN SERPL-MCNC: 20 MG/DL (ref 5–25)
CALCIUM SERPL-MCNC: 8.3 MG/DL (ref 8.3–10.1)
CHLORIDE SERPL-SCNC: 107 MMOL/L (ref 100–108)
CO2 SERPL-SCNC: 26 MMOL/L (ref 21–32)
CREAT SERPL-MCNC: 0.77 MG/DL (ref 0.6–1.3)
EOSINOPHIL # BLD MANUAL: 0.09 THOUSAND/UL (ref 0–0.4)
EOSINOPHIL NFR BLD MANUAL: 1 % (ref 0–6)
ERYTHROCYTE [DISTWIDTH] IN BLOOD BY AUTOMATED COUNT: 16.2 % (ref 11.6–15.1)
GFR SERPL CREATININE-BSD FRML MDRD: 66 ML/MIN/1.73SQ M
GLUCOSE SERPL-MCNC: 129 MG/DL (ref 65–140)
HCT VFR BLD AUTO: 28.6 % (ref 34.8–46.1)
HGB BLD-MCNC: 9 G/DL (ref 11.5–15.4)
LACTATE SERPL-SCNC: 1 MMOL/L (ref 0.5–2)
LG PLATELETS BLD QL SMEAR: PRESENT
LYMPHOCYTES # BLD AUTO: 0.85 THOUSAND/UL (ref 0.6–4.47)
LYMPHOCYTES # BLD AUTO: 9 % (ref 14–44)
MCH RBC QN AUTO: 26.2 PG (ref 26.8–34.3)
MCHC RBC AUTO-ENTMCNC: 31.5 G/DL (ref 31.4–37.4)
MCV RBC AUTO: 83 FL (ref 82–98)
MONOCYTES # BLD AUTO: 0.76 THOUSAND/UL (ref 0–1.22)
MONOCYTES NFR BLD: 8 % (ref 4–12)
NEUTROPHILS # BLD MANUAL: 7.76 THOUSAND/UL (ref 1.85–7.62)
NEUTS BAND NFR BLD MANUAL: 1 % (ref 0–8)
NEUTS SEG NFR BLD AUTO: 81 % (ref 43–75)
PLATELET # BLD AUTO: 381 THOUSANDS/UL (ref 149–390)
PLATELET BLD QL SMEAR: ADEQUATE
PMV BLD AUTO: 9.9 FL (ref 8.9–12.7)
POLYCHROMASIA BLD QL SMEAR: PRESENT
POTASSIUM SERPL-SCNC: 4.2 MMOL/L (ref 3.5–5.3)
PROT SERPL-MCNC: 5.7 G/DL (ref 6.4–8.2)
RBC # BLD AUTO: 3.44 MILLION/UL (ref 3.81–5.12)
SODIUM SERPL-SCNC: 140 MMOL/L (ref 136–145)
TOTAL CELLS COUNTED SPEC: 100
WBC # BLD AUTO: 9.46 THOUSAND/UL (ref 4.31–10.16)

## 2018-01-20 PROCEDURE — 83605 ASSAY OF LACTIC ACID: CPT | Performed by: INTERNAL MEDICINE

## 2018-01-20 PROCEDURE — 99284 EMERGENCY DEPT VISIT MOD MDM: CPT

## 2018-01-20 PROCEDURE — 36415 COLL VENOUS BLD VENIPUNCTURE: CPT | Performed by: EMERGENCY MEDICINE

## 2018-01-20 PROCEDURE — 85027 COMPLETE CBC AUTOMATED: CPT | Performed by: EMERGENCY MEDICINE

## 2018-01-20 PROCEDURE — 80053 COMPREHEN METABOLIC PANEL: CPT | Performed by: EMERGENCY MEDICINE

## 2018-01-20 PROCEDURE — 87040 BLOOD CULTURE FOR BACTERIA: CPT | Performed by: EMERGENCY MEDICINE

## 2018-01-20 PROCEDURE — 85007 BL SMEAR W/DIFF WBC COUNT: CPT | Performed by: EMERGENCY MEDICINE

## 2018-01-20 RX ORDER — POLYVINYL ALCOHOL 14 MG/ML
1 SOLUTION/ DROPS OPHTHALMIC 3 TIMES DAILY
Status: DISCONTINUED | OUTPATIENT
Start: 2018-01-20 | End: 2018-01-24 | Stop reason: HOSPADM

## 2018-01-20 RX ORDER — LANOLIN ALCOHOL/MO/W.PET/CERES
3 CREAM (GRAM) TOPICAL
Status: DISCONTINUED | OUTPATIENT
Start: 2018-01-20 | End: 2018-01-24 | Stop reason: HOSPADM

## 2018-01-20 RX ORDER — DICYCLOMINE HYDROCHLORIDE 10 MG/1
20 CAPSULE ORAL 2 TIMES DAILY PRN
Status: DISCONTINUED | OUTPATIENT
Start: 2018-01-20 | End: 2018-01-24 | Stop reason: HOSPADM

## 2018-01-20 RX ORDER — HEPARIN SODIUM 5000 [USP'U]/ML
5000 INJECTION, SOLUTION INTRAVENOUS; SUBCUTANEOUS EVERY 8 HOURS SCHEDULED
Status: DISCONTINUED | OUTPATIENT
Start: 2018-01-20 | End: 2018-01-20

## 2018-01-20 RX ORDER — MINERAL OIL 100 G/100G
1 OIL RECTAL
Status: DISCONTINUED | OUTPATIENT
Start: 2018-01-20 | End: 2018-01-24 | Stop reason: HOSPADM

## 2018-01-20 RX ORDER — ACETAMINOPHEN 325 MG/1
975 TABLET ORAL 3 TIMES DAILY
Status: DISCONTINUED | OUTPATIENT
Start: 2018-01-20 | End: 2018-01-24 | Stop reason: HOSPADM

## 2018-01-20 RX ORDER — LEVOTHYROXINE SODIUM 0.03 MG/1
25 TABLET ORAL
Status: DISCONTINUED | OUTPATIENT
Start: 2018-01-21 | End: 2018-01-24 | Stop reason: HOSPADM

## 2018-01-20 RX ORDER — CITALOPRAM 20 MG/1
20 TABLET ORAL DAILY
Status: DISCONTINUED | OUTPATIENT
Start: 2018-01-21 | End: 2018-01-24 | Stop reason: HOSPADM

## 2018-01-20 RX ORDER — SUCRALFATE 1 G/1
1 TABLET ORAL 4 TIMES DAILY
Status: DISCONTINUED | OUTPATIENT
Start: 2018-01-20 | End: 2018-01-24 | Stop reason: HOSPADM

## 2018-01-20 RX ORDER — SODIUM CHLORIDE 9 MG/ML
50 INJECTION, SOLUTION INTRAVENOUS CONTINUOUS
Status: DISCONTINUED | OUTPATIENT
Start: 2018-01-20 | End: 2018-01-23

## 2018-01-20 RX ORDER — ONDANSETRON 4 MG/1
4 TABLET, ORALLY DISINTEGRATING ORAL EVERY 8 HOURS PRN
Status: DISCONTINUED | OUTPATIENT
Start: 2018-01-20 | End: 2018-01-24 | Stop reason: HOSPADM

## 2018-01-20 RX ORDER — PANTOPRAZOLE SODIUM 40 MG/1
40 TABLET, DELAYED RELEASE ORAL
Status: DISCONTINUED | OUTPATIENT
Start: 2018-01-21 | End: 2018-01-24 | Stop reason: HOSPADM

## 2018-01-20 RX ORDER — SODIUM CHLORIDE 1000 MG
1 TABLET, SOLUBLE MISCELLANEOUS 2 TIMES DAILY
COMMUNITY

## 2018-01-20 RX ADMIN — SODIUM CHLORIDE 50 ML/HR: 0.9 INJECTION, SOLUTION INTRAVENOUS at 19:50

## 2018-01-20 RX ADMIN — SUCRALFATE 1 G: 1 TABLET ORAL at 23:21

## 2018-01-20 RX ADMIN — POLYVINYL ALCOHOL 1 DROP: 14 SOLUTION/ DROPS OPHTHALMIC at 20:33

## 2018-01-20 RX ADMIN — MELATONIN 3 MG: 3 TAB ORAL at 23:21

## 2018-01-20 RX ADMIN — ACETAMINOPHEN 975 MG: 325 TABLET, FILM COATED ORAL at 20:29

## 2018-01-20 NOTE — H&P
History and Physical - [facility] Internal Medicine    Patient Information: Jarad Willis 80 y o  female MRN: [id number]  Unit/Bed#: RUDY Encounter: [id number]  Admitting Physician: Abeba Hahn MD  PCP: No primary care provider on file  Date of Admission:  01/20/18    Assessment/Plan:    Hospital Problem List:     Active Problems:    Anxiety and depression    Ambulatory dysfunction    Dementia    Encephalopathy      Plan for the Primary Problem(s): Altered mental status  As per patient's son Arianne Luna, patient has not been herself since her last hospitalization  Unclear what the cause is  Will follow up on blood cultures and monitor clinically  Ambulatory dysfunction  S/p hip fracture repair on last admission  PT evaluation  Positive blood cultures  1 bottle has flagged positive for GPC   Likely contaminant but given that patient was just in the ER and sent home yesterday, and was recently discharged, will follow up on these and consult ID  Family are very concerned why "she keeps getting sent back"  Plan for Additional Problems:   Dementia  At baseline patient is confused  Can remember her son's name, but only when prompted  Recently though she has been engaging less than what is normal for her  Anxiety  C/w citalopram    VTE Prophylaxis: Heparin  / sequential compression device   Code Status: Level 3 DNR and DNI  POLST: There is no POLST form on file for this patient (pre-hospital)    Anticipated Length of Stay:  Patient will be admitted on an Inpatient basis with an anticipated length of stay of  more 2 midnights  Justification for Hospital Stay: positive blood cultures  Total Time for Visit, including Counseling / Coordination of Care: 45 minutes  Greater than 50% of this total time spent on direct patient counseling and coordination of care  Chief Complaint:     Abnormal lab  History of Present Illness:     Jarad Willis is a 80 y o  female who presents with positive blood cultures which were drawn in the ED yesterday  Yesterday patient had presented to ED from NH because she was feeling weak and complaining of generalized abdominal pain  She had recently been discharged from [place] [address] on 1/14 following admission for hip fracture repair  Since then her family state she has "not been herself"  She otherwise suffers from some anxiety for which she is on citalopram      Review of Systems:    Review of Systems   Unable to perform ROS: Dementia       Past Medical and Surgical History:     Past Medical History:   Diagnosis Date    Anxiety     Depression     GERD (gastroesophageal reflux disease)     Lumbago     Osteoporosis     Thrombocytopenia ([address] )        Past Surgical History:   Procedure Laterality Date    [place] PARTIAL HIP REPLACEMENT Right 1/16/2018    Procedure: Right Hip Hemiarthroplasty;  Surgeon: Linda Crystal DO;  Location: [facility];  Service: Orthopedics       Meds/Allergies:    Prior to Admission medications    Medication Sig Start Date End Date Taking?  Authorizing Provider   acetaminophen (TYLENOL) 325 mg tablet Take 3 tablets by mouth 3 (three) times a day 1/18/18  Yes Juliana Villagomez DO   citalopram (CeleXA) 20 mg tablet Take 20 mg by mouth daily   Yes Historical Provider, MD   levothyroxine 25 mcg tablet Take 25 mcg by mouth daily   Yes Historical Provider, MD   MELATONIN PO Take 10 mg by mouth daily at bedtime   Yes Historical Provider, MD   omeprazole (PriLOSEC) 40 MG capsule Take 40 mg by mouth daily   Yes Historical Provider, MD   polyethylene glycol (MIRALAX) 17 g packet Take 17 g by mouth daily   Yes Historical Provider, MD   senna-docusate sodium (SENOKOT-S) 8 6-50 mg per tablet Take 1 tablet by mouth daily   Yes Historical Provider, MD   sodium chloride 1 g tablet Take 1 g by mouth 2 (two) times a day   Yes Historical Provider, MD   sucralfate (CARAFATE) 1 g tablet Take 1 g by mouth 4 (four) times a day Half hour before meals and bedtime   Yes Historical Provider, MD   traMADol (ULTRAM) 50 mg tablet Take 1 tablet by mouth every 8 (eight) hours as needed for moderate pain or severe pain for up to 10 days 1/18/18 1/28/18 Yes Ramsey Graham DO   enoxaparin (LOVENOX) 40 mg/0 4 mL Inject 0 3 mL under the skin daily 1/19/18   Ramsey Graham DO   mineral oil enema Insert 1 enema into the rectum every other day as needed for constipation    Historical Provider, MD   ondansetron (ZOFRAN-ODT) 4 mg disintegrating tablet Take 4 mg by mouth every 8 (eight) hours as needed for nausea or vomiting    Historical Provider, MD   polyvinyl alcohol (LIQUIFILM TEARS) 1 4 % ophthalmic solution Administer 1 drop to both eyes 3 (three) times a day    Historical Provider, MD   dicyclomine (BENTYL) 20 mg tablet Take 1 tablet by mouth 2 (two) times a day as needed (Abdominal pain/cramping) 11/25/16 1/20/18  Jose Maloney DO     I have reviewed home medications with patient personally  Allergies: No Known Allergies    Social History:     Marital Status:    Occupation: retired  [facility] resident  Patient Pre-hospital Living Situation: NH  Patient Pre-hospital Level of Mobility: limited  Patient Pre-hospital Diet Restrictions: none  Substance Use History:   History   Alcohol Use No     History   Smoking Status    Never Smoker   Smokeless Tobacco    Never Used     History   Drug Use No       Family History:    History reviewed  No pertinent family history  Physical Exam:     Vitals:   Blood Pressure: 129/56 (01/20/18 1630)  Pulse: 100 (01/20/18 1630)  Temperature: 98 8 °F (37 1 °C) (01/20/18 1217)  Temp Source: Oral (01/20/18 1217)  Respirations: 20 (01/20/18 1630)  Weight - Scale: 64 4 kg (141 lb 15 6 oz) (01/20/18 1217)  SpO2: 95 % (01/20/18 1630)    Physical Exam   Constitutional:   Frail elderly  HENT:   Head: Normocephalic and atraumatic  Eyes: Right eye exhibits no discharge  Left eye exhibits no discharge  No scleral icterus     Neck: No JVD present  No tracheal deviation present  No thyromegaly present  Cardiovascular: Normal rate  Exam reveals no gallop and no friction rub  No murmur heard  Pulmonary/Chest: No respiratory distress  She has no wheezes  She has no rales  She exhibits no tenderness  Abdominal: She exhibits no distension and no mass  There is no tenderness  There is no rebound and no guarding  Musculoskeletal: She exhibits no edema, tenderness or deformity  Lymphadenopathy:     She has no cervical adenopathy  Neurological: She is alert  No cranial nerve deficit  Coordination normal    Skin: No rash noted  No erythema  No pallor  Psychiatric: She has a normal mood and affect  Additional Data:     Lab Results: I have personally reviewed pertinent reports  Results from last 7 days  Lab Units 01/20/18  1303  01/14/18  2042   WBC Thousand/uL 9 46  < > 18 67*   HEMOGLOBIN g/dL 9 0*  < > 11 3*   HEMATOCRIT % 28 6*  < > 36 0   PLATELETS Thousands/uL 381  < > 381   NEUTROS PCT %  --   --  80*   LYMPHS PCT %  --   --  8*   LYMPHO PCT % 9*  < >  --    MONOS PCT %  --   --  11   MONO PCT MAN % 8  < >  --    EOS PCT %  --   --  1   EOSINO PCT MANUAL % 1  < >  --    < > = values in this interval not displayed  Results from last 7 days  Lab Units 01/20/18  1303   SODIUM mmol/L 140   POTASSIUM mmol/L 4 2   CHLORIDE mmol/L 107   CO2 mmol/L 26   BUN mg/dL 20   CREATININE mg/dL 0 77   CALCIUM mg/dL 8 3   TOTAL PROTEIN g/dL 5 7*   BILIRUBIN TOTAL mg/dL 1 00   ALK PHOS U/L 72   ALT U/L 13   AST U/L 17   GLUCOSE RANDOM mg/dL 129       Results from last 7 days  Lab Units 01/19/18  1050   INR  1 55*       Imaging: I have personally reviewed pertinent reports  Xr Chest 2 Views    Result Date: 1/19/2018  Narrative: CHEST INDICATION:  SOB, cough  History taken directly from the electronic ordering system  COMPARISON:  1/14/2018 VIEWS:  Frontal and lateral projections IMAGES:  2 FINDINGS:  Large hiatal hernia  Cardiomediastinal silhouette appears unremarkable  The lungs are clear  No pneumothorax or pleural effusion  Osteopenia  Multiple chronic compression deformities in the thoracic spine  Impression: No active pulmonary disease  Workstation performed: [id number]     Xr Hip/pelv 2-3 Vws Right    Result Date: 1/15/2018  Narrative: RIGHT HIP INDICATION:  "FALL, RT HIP PAIN  " COMPARISON: None VIEWS: AP pelvis and 2 coned down views of the hip IMAGES:  3 FINDINGS: Proximal right femoral neck fracture  Probable acute right inferior pubic ramus fracture  Mild bilateral hip joint space narrowing and acetabular osteophyte formation on a mild degenerative basis  No lytic or blastic lesions are seen  Soft tissues are unremarkable  Impression: Proximal right femoral neck fracture  Probable acute right inferior pubic ramus fracture  Workstation performed: [id number]     Ct Head Without Contrast    Result Date: 1/14/2018  Narrative: CT BRAIN - WITHOUT CONTRAST INDICATION:  Fall  COMPARISON:  None  TECHNIQUE:  CT examination of the brain was performed  In addition to axial images, coronal reformatted images were created and submitted for interpretation  Radiation dose length product (DLP) for this visit:  2647 mGy-cm   This examination, like all CT scans performed in the [facility], was performed utilizing techniques to minimize radiation dose exposure, including the use of iterative reconstruction and automated exposure control  IMAGE QUALITY:  Diagnostic  FINDINGS:  PARENCHYMA:  Decreased attenuation is noted in the supratentorial white matter demonstrating an appearance most consistent with severe microangiopathic change  No intracranial mass, mass effect or midline shift  No CT signs of acute infarction  There is no parenchymal hemorrhage  VENTRICLES AND EXTRA-AXIAL SPACES:  Enlargement of ventricles and extra-axial CSF spaces consistent with cerebral and cerebellar atrophy   VISUALIZED ORBITS AND PARANASAL SINUSES:  Unremarkable  CALVARIUM AND EXTRACRANIAL SOFT TISSUES:   Normal      Impression: No acute intracranial abnormality  Workstation performed: [id number]     Ct Cervical Spine Without Contrast    Result Date: 1/14/2018  Narrative: CT CERVICAL SPINE - WITHOUT CONTRAST INDICATION: Fall  COMPARISON: None  TECHNIQUE:  CT examination of the cervical spine was performed without intravenous contrast   Contiguous axial images were obtained  Sagittal and coronal reconstructions were performed  Radiation dose length product (DLP) for this visit:  387 mGy-cm   This examination, like all CT scans performed in the [facility], was performed utilizing techniques to minimize radiation dose exposure, including the use of iterative reconstruction and automated exposure control  IMAGE QUALITY:  Diagnostic  FINDINGS: ALIGNMENT:  Normal alignment of the cervical spine  No subluxation  VERTEBRAL BODIES:  No fracture  DEGENERATIVE CHANGES:  Mild multilevel cervical degenerative changes are noted without critical central canal stenosis  PREVERTEBRAL AND PARASPINAL SOFT TISSUES: Normal         THORACIC INLET:  Normal      Impression: No cervical spine fracture or traumatic malalignment  Workstation performed: [id number]     Xr Chest 1 View    Result Date: 1/15/2018  Narrative: CHEST- PA VIEW INDICATION: " FALL, RT HIP PAIN  " COMPARISON:  Two-view chest 9/16/2014 VIEWS:   PA view IMAGES:  1 FINDINGS:     Retrocardiac density correlates to a known hiatal hernia  Cardiomediastinal contours are stable  The lungs are clear  No pneumothorax or pleural effusion  Visualized osseous structures appear within normal limits for the patient's age  Impression: No active pulmonary disease  Workstation performed: [id number]       EKG, Pathology, and Other Studies Reviewed on Admission:   · EKG: EKG from yesterday - sinus tachycardia       Allscripts / Epic Records Reviewed: Yes     ** Please Note: This note has been constructed using a voice recognition system   **

## 2018-01-20 NOTE — ED NOTES
Updated Therese Soto, pts son, on condition and plan of care  Therese Soto agrees        Saira Ortiz, ANDREI  01/20/18 9568

## 2018-01-20 NOTE — ED PROVIDER NOTES
Pt Name: Priscilla Monroe  MRN: [id number]  [place] 7/14/1922  Age/Sex: 80 y o  female  Date of evaluation: 1/20/2018  PCP: No primary care provider on file  CHIEF COMPLAINT    Chief Complaint   Patient presents with    Abnormal Lab     Pt presents to the ED from longterm for abnormal labs  Positive growth in blood culture  S/p right hip replacement  HPI    Sohail presents to the Emergency Department after she had 1/2 positive blood cultures drawn yesterday in ER  She was seen in ER, referred for admission, seen by Dalia Sams and then felt well enough for DC  She recently had right hip replacement surgery  HPI      Past Medical and Surgical History    Past Medical History:   Diagnosis Date    Anxiety     Depression     GERD (gastroesophageal reflux disease)     Lumbago     Osteoporosis     Thrombocytopenia ([address] )        Past Surgical History:   Procedure Laterality Date    [place] PARTIAL HIP REPLACEMENT Right 1/16/2018    Procedure: Right Hip Hemiarthroplasty;  Surgeon: Jose Velez DO;  [facility];  Service: Orthopedics       History reviewed  No pertinent family history  Social History   Substance Use Topics    Smoking status: Never Smoker    Smokeless tobacco: Never Used    Alcohol use No              Allergies    No Known Allergies    Home Medications    Prior to Admission medications    Medication Sig Start Date End Date Taking?  Authorizing Provider   acetaminophen (TYLENOL) 325 mg tablet Take 3 tablets by mouth 3 (three) times a day 1/18/18   Kacy Rios DO   citalopram (CeleXA) 20 mg tablet Take 20 mg by mouth daily    Historical Provider, MD   dicyclomine (BENTYL) 20 mg tablet Take 1 tablet by mouth 2 (two) times a day as needed (Abdominal pain/cramping) 11/25/16   Darin Lourena Sever, DO   enoxaparin (LOVENOX) 40 mg/0 4 mL Inject 0 3 mL under the skin daily 1/19/18   Kacy Rios DO   levothyroxine 25 mcg tablet Take 25 mcg by mouth daily    Historical Provider, MD MELATONIN PO Take 10 mg by mouth daily at bedtime    Historical Provider, MD   mineral oil enema Insert 1 enema into the rectum every other day as needed for constipation    Historical Provider, MD   omeprazole (PriLOSEC) 40 MG capsule Take 40 mg by mouth daily    Historical Provider, MD   ondansetron (ZOFRAN-ODT) 4 mg disintegrating tablet Take 4 mg by mouth every 8 (eight) hours as needed for nausea or vomiting    Historical Provider, MD   polyethylene glycol (MIRALAX) 17 g packet Take 17 g by mouth daily    Historical Provider, MD   polyvinyl alcohol (LIQUIFILM TEARS) 1 4 % ophthalmic solution Administer 1 drop to both eyes 3 (three) times a day    Historical Provider, MD   senna-docusate sodium (SENOKOT-S) 8 6-50 mg per tablet Take 1 tablet by mouth daily    Historical Provider, MD   sucralfate (CARAFATE) 1 g tablet Take 1 g by mouth 4 (four) times a day Half hour before meals and bedtime    Historical Provider, MD   traMADol (ULTRAM) 50 mg tablet Take 1 tablet by mouth every 8 (eight) hours as needed for moderate pain or severe pain for up to 10 days 1/18/18 1/28/18  Gwen Lipoma, DO           Review of Systems    Review of Systems   Constitutional: Positive for fatigue  Negative for activity change, appetite change, chills, diaphoresis and fever  HENT: Negative for congestion, postnasal drip, rhinorrhea, sinus pressure, sneezing and sore throat  Eyes: Negative for pain and visual disturbance  Respiratory: Positive for cough  Negative for chest tightness and shortness of breath  Cardiovascular: Negative for chest pain, palpitations and leg swelling  Gastrointestinal: Negative for abdominal distention, abdominal pain, constipation, diarrhea, nausea and vomiting  Endocrine: Negative for polydipsia, polyphagia and polyuria  Genitourinary: Negative for decreased urine volume, difficulty urinating, dysuria, flank pain, frequency and hematuria     Musculoskeletal: Positive for gait problem and joint swelling  Negative for arthralgias and neck pain  Skin: Positive for pallor  Negative for rash  Allergic/Immunologic: Negative for immunocompromised state  Neurological: Negative for syncope, speech difficulty, weakness, light-headedness, numbness and headaches  All other systems reviewed and are negative  Physical Exam      ED Triage Vitals [01/20/18 1217]   Temperature Pulse Respirations Blood Pressure SpO2   98 8 °F (37 1 °C) 101 20 161/72 99 %      Temp Source Heart Rate Source Patient Position - Orthostatic VS BP Location FiO2 (%)   Oral Monitor Sitting Right arm --      Pain Score       9               Physical Exam   Constitutional: She is oriented to person, place, and time  She appears well-developed and well-nourished  No distress  HENT:   Head: Normocephalic and atraumatic  Nose: Nose normal    Mouth/Throat: Oropharynx is clear and moist    Eyes: Conjunctivae, EOM and lids are normal  Pupils are equal, round, and reactive to light  Neck: Normal range of motion  Neck supple  Cardiovascular: Normal rate, regular rhythm and normal heart sounds  Exam reveals no gallop and no friction rub  No murmur heard  Pulmonary/Chest: Effort normal and breath sounds normal  No accessory muscle usage  No respiratory distress  She has no wheezes  She has no rales  Abdominal: Soft  She exhibits no distension  There is no tenderness  There is no rebound and no guarding  Musculoskeletal:        Right hip: She exhibits tenderness  Legs:  Neurological: She is alert and oriented to person, place, and time  No cranial nerve deficit or sensory deficit  Skin: Skin is warm and dry  No rash noted  She is not diaphoretic  No erythema  Psychiatric: She has a normal mood and affect  Her speech is normal and behavior is normal  Judgment and thought content normal    Nursing note and vitals reviewed               Assessment and Plan    Eloina Walton is a 80 y o  female who presents with positive blood cultures  Differential diagnosis (not completely inclusive) includes bacteremia  Plan will be to perform diagnostic testing and treat symptomatically  MDM    Diagnostic Results          Labs:    Results for orders placed or performed during the hospital encounter of 01/20/18   CBC and differential   Result Value Ref Range    WBC 9 46 4 31 - 10 16 Thousand/uL    RBC 3 44 (L) 3 81 - 5 12 Million/uL    Hemoglobin 9 0 (L) 11 5 - 15 4 g/dL    Hematocrit 28 6 (L) 34 8 - 46 1 %    MCV 83 82 - 98 fL    MCH 26 2 (L) 26 8 - 34 3 pg    MCHC 31 5 31 4 - 37 4 g/dL    RDW 16 2 (H) 11 6 - 15 1 %    MPV 9 9 8 9 - 12 7 fL    Platelets 070 284 - 928 Thousands/uL   Comprehensive metabolic panel   Result Value Ref Range    Sodium 140 136 - 145 mmol/L    Potassium 4 2 3 5 - 5 3 mmol/L    Chloride 107 100 - 108 mmol/L    CO2 26 21 - 32 mmol/L    Anion Gap 7 4 - 13 mmol/L    BUN 20 5 - 25 mg/dL    Creatinine 0 77 0 60 - 1 30 mg/dL    Glucose 129 65 - 140 mg/dL    Calcium 8 3 8 3 - 10 1 mg/dL    AST 17 5 - 45 U/L    ALT 13 12 - 78 U/L    Alkaline Phosphatase 72 46 - 116 U/L    Total Protein 5 7 (L) 6 4 - 8 2 g/dL    Albumin 2 1 (L) 3 5 - 5 0 g/dL    Total Bilirubin 1 00 0 20 - 1 00 mg/dL    eGFR 66 ml/min/1 73sq m   Manual Differential(PHLEBS Do Not Order)   Result Value Ref Range    Segmented % 81 (H) 43 - 75 %    Bands % 1 0 - 8 %    Lymphocytes % 9 (L) 14 - 44 %    Monocytes % 8 4 - 12 %    Eosinophils % 1 0 - 6 %    Basophils % 0 0 - 1 %    Absolute Neutrophils 7 76 (H) 1 85 - 7 62 Thousand/uL    Lymphocytes Absolute 0 85 0 60 - 4 47 Thousand/uL    Monocytes Absolute 0 76 0 00 - 1 22 Thousand/uL    Eosinophils Absolute 0 09 0 00 - 0 40 Thousand/uL    Basophils Absolute 0 00 0 00 - 0 10 Thousand/uL    Total Counted 100     Anisocytosis Present     Polychromasia Present     Platelet Estimate Adequate Adequate    Large Platelet Present        All labs reviewed and utilized in the medical decision making process    Radiology:    No orders to display       All radiology studies independently viewed by me and interpreted by the radiologist     Procedure    Procedures    CritCare Time      ED Course of Care and Re-Assessments    Patient has been recently hospitalized  She has had recent surgery  She has positive blood cultures with no clear source and could be contaminant  Repeat blood cultures ordered  Recent surgical site is possible source but will hold off on abx and leave that to the discretion of admitting and consulting team   She does however have a left shift that favors active infection  Medications - No data to display        FINAL IMPRESSION    Final diagnoses:   Positive blood cultures         DISPOSITION/PLAN      Time reflects when diagnosis was documented in both MDM as applicable and the Disposition within this note     Time User Action Codes Description Comment    1/20/2018  4:15 PM Cindy Carreon Add [R78 94] Positive blood cultures       ED Disposition     ED Disposition Condition Comment    Admit  Case was discussed with JASSI and the patient's admission status was agreed to be Admission Status: inpatient status to the service of Dr Maya Izquierdo:    No follow-up provider specified  DISCHARGE MEDICATIONS:    Patient's Medications   Discharge Prescriptions    No medications on file       No discharge procedures on file           Trevin Laguna, DO Trevin Laguna DO  01/20/18 0711

## 2018-01-21 LAB
ALBUMIN SERPL BCP-MCNC: 1.9 G/DL (ref 3.5–5)
ALP SERPL-CCNC: 69 U/L (ref 46–116)
ALT SERPL W P-5'-P-CCNC: 8 U/L (ref 12–78)
ANION GAP SERPL CALCULATED.3IONS-SCNC: 9 MMOL/L (ref 4–13)
AST SERPL W P-5'-P-CCNC: 17 U/L (ref 5–45)
BACTERIA BLD CULT: ABNORMAL
BILIRUB SERPL-MCNC: 1.1 MG/DL (ref 0.2–1)
BUN SERPL-MCNC: 20 MG/DL (ref 5–25)
CALCIUM SERPL-MCNC: 8 MG/DL (ref 8.3–10.1)
CHLORIDE SERPL-SCNC: 107 MMOL/L (ref 100–108)
CO2 SERPL-SCNC: 24 MMOL/L (ref 21–32)
CREAT SERPL-MCNC: 0.75 MG/DL (ref 0.6–1.3)
ERYTHROCYTE [DISTWIDTH] IN BLOOD BY AUTOMATED COUNT: 16.2 % (ref 11.6–15.1)
GFR SERPL CREATININE-BSD FRML MDRD: 68 ML/MIN/1.73SQ M
GLUCOSE SERPL-MCNC: 96 MG/DL (ref 65–140)
GRAM STN SPEC: ABNORMAL
HCT VFR BLD AUTO: 27.4 % (ref 34.8–46.1)
HGB BLD-MCNC: 8.4 G/DL (ref 11.5–15.4)
MCH RBC QN AUTO: 25.5 PG (ref 26.8–34.3)
MCHC RBC AUTO-ENTMCNC: 30.7 G/DL (ref 31.4–37.4)
MCV RBC AUTO: 83 FL (ref 82–98)
PLATELET # BLD AUTO: 424 THOUSANDS/UL (ref 149–390)
PMV BLD AUTO: 9.6 FL (ref 8.9–12.7)
POTASSIUM SERPL-SCNC: 3.5 MMOL/L (ref 3.5–5.3)
PROT SERPL-MCNC: 5.5 G/DL (ref 6.4–8.2)
RBC # BLD AUTO: 3.29 MILLION/UL (ref 3.81–5.12)
SODIUM SERPL-SCNC: 140 MMOL/L (ref 136–145)
WBC # BLD AUTO: 7.68 THOUSAND/UL (ref 4.31–10.16)

## 2018-01-21 PROCEDURE — 80053 COMPREHEN METABOLIC PANEL: CPT | Performed by: INTERNAL MEDICINE

## 2018-01-21 PROCEDURE — 92610 EVALUATE SWALLOWING FUNCTION: CPT

## 2018-01-21 PROCEDURE — G8997 SWALLOW GOAL STATUS: HCPCS

## 2018-01-21 PROCEDURE — 85027 COMPLETE CBC AUTOMATED: CPT | Performed by: INTERNAL MEDICINE

## 2018-01-21 PROCEDURE — G8996 SWALLOW CURRENT STATUS: HCPCS

## 2018-01-21 RX ORDER — OXYCODONE HYDROCHLORIDE 5 MG/1
2.5 TABLET ORAL EVERY 4 HOURS PRN
Status: DISCONTINUED | OUTPATIENT
Start: 2018-01-21 | End: 2018-01-24 | Stop reason: HOSPADM

## 2018-01-21 RX ORDER — OXYCODONE HYDROCHLORIDE 5 MG/1
5 TABLET ORAL EVERY 4 HOURS PRN
Status: DISCONTINUED | OUTPATIENT
Start: 2018-01-21 | End: 2018-01-24 | Stop reason: HOSPADM

## 2018-01-21 RX ADMIN — PANTOPRAZOLE SODIUM 40 MG: 40 TABLET, DELAYED RELEASE ORAL at 05:24

## 2018-01-21 RX ADMIN — SUCRALFATE 1 G: 1 TABLET ORAL at 22:26

## 2018-01-21 RX ADMIN — ACETAMINOPHEN 975 MG: 325 TABLET, FILM COATED ORAL at 08:16

## 2018-01-21 RX ADMIN — ACETAMINOPHEN 975 MG: 325 TABLET, FILM COATED ORAL at 22:25

## 2018-01-21 RX ADMIN — SUCRALFATE 1 G: 1 TABLET ORAL at 08:16

## 2018-01-21 RX ADMIN — POLYVINYL ALCOHOL 1 DROP: 14 SOLUTION/ DROPS OPHTHALMIC at 16:22

## 2018-01-21 RX ADMIN — SUCRALFATE 1 G: 1 TABLET ORAL at 16:52

## 2018-01-21 RX ADMIN — POLYVINYL ALCOHOL 1 DROP: 14 SOLUTION/ DROPS OPHTHALMIC at 22:26

## 2018-01-21 RX ADMIN — LEVOTHYROXINE SODIUM 25 MCG: 25 TABLET ORAL at 05:24

## 2018-01-21 RX ADMIN — ACETAMINOPHEN 975 MG: 325 TABLET, FILM COATED ORAL at 16:19

## 2018-01-21 RX ADMIN — MELATONIN 3 MG: 3 TAB ORAL at 22:26

## 2018-01-21 RX ADMIN — CITALOPRAM HYDROBROMIDE 20 MG: 20 TABLET ORAL at 08:16

## 2018-01-21 RX ADMIN — POLYVINYL ALCOHOL 1 DROP: 14 SOLUTION/ DROPS OPHTHALMIC at 08:17

## 2018-01-21 RX ADMIN — ENOXAPARIN SODIUM 30 MG: 40 INJECTION SUBCUTANEOUS at 08:17

## 2018-01-21 NOTE — SPEECH THERAPY NOTE
Speech-Language Pathology Bedside Swallow Evaluation        Patient Name: Priscilla Monroe    Today's Date: 1/21/2018     Problem List  Patient Active Problem List   Diagnosis    Anxiety and depression    GERD (gastroesophageal reflux disease)    Closed fracture of neck of right femur ([address] )    Ambulatory dysfunction    Closed displaced fracture of right femoral neck (HCC)    Dementia    Murmur    Blood loss anemia    Elevated lactic acid level    Encephalopathy       Past Medical History  Past Medical History:   Diagnosis Date    Anxiety     Depression     GERD (gastroesophageal reflux disease)     Lumbago     Osteoporosis     Thrombocytopenia (HCC)        Past Surgical History  Past Surgical History:   Procedure Laterality Date    OR PARTIAL HIP REPLACEMENT Right 1/16/2018    Procedure: Right Hip Hemiarthroplasty;  Surgeon: Jose Velez DO;  [facility]: [facility];  Service: Orthopedics         Current Medical Status  Priscilla Monroe is a 80 y o  female who presents with positive blood cultures which were drawn in the ED yesterday  Yesterday patient had presented to ED from NH because she was feeling weak and complaining of generalized abdominal pain  She had recently been discharged from [place] [address] on 1/14 following admission for hip fracture repair  Since then her family state she has "not been herself"  She otherwise suffers from some anxiety for which she is on citalopram    Son and granddaughter present for eval     Past medical history:   Please see H&P for details      Special Studies:  CXR-NAD  WBC-WNL    Social/Education/Vocational Hx:  Pt lives in SNF/ECF      Swallow Information   Current Risks for Dysphagia & Aspiration: AMS, but family report pt is much more alert today  Current Symptoms/Concerns: coughing with po and change in respiratory status  Last night PCA was assisting pt with dinner, when pt began coughing  Pt was made NPO until seen by ST      Current Diet: NPO      Baseline Diet: regular diet and thin liquids      Baseline Assessment   Behavior/Cognition: alert and confused    Speech/Language Status: able to participate in basic conversation and able to follow commands inconsistently    Patient Positioning: upright in chair       Swallow Mechanism Exam   Facial: symmetrical  Labial: WFL  Lingual: WFL  Velum: unable to visualize  Mandible: adequate ROM  Dentition: full dentures  Vocal quality:clear/adequate   Volitional Cough: strong/productive   Respiratory: Pt on nasal cannula      Consistencies Assessed and Performance   Consistencies Administered: ice chips, thin liquids, nectar thick, puree and hard solids  -thin water by cup and straw, NTL by cup and straw, applesauce (2 oz) and a shortbread cookie (a cookie was offered as pt's family report she loves cookies, and they felt that she would be willing to eat that)  Oral Stage: Prolonged mastication, bolus formation, and transfer, with no signficant oral residue noted  Pt appears fatigued during mastication  Pharyngeal Stage: Swallows appear timely to mildly delayed, with hyolaryngeal elevation palpated and judged somewhat reduced  Strong coughing noted after sips of thin water, by cup and straw, followed by increased WOB and audible breath sounds  Immediate cough noted after NTL by straw, but no clinical s/s of aspiration with sips from cup (3 oz)  No s/s of aspiration with pureed or with the cookie  Esophageal Concerns: none reported      Summary   Swallow Summary: Evaluation was somewhat limted due to pt confusion and distractibility  Pt presents with mild-mod oropharyngeal dysphagia, characterized by prolonged mastication, bolus formation and transfer, timely to mildly delayed swallows, and reduced hyolaryngeal elevation  There were immediate s/s of aspiration of thin liquids by cup and straw, and NTL by straw  Pt tolerated NTL by cup, pureed foods, and a cookie with no s/s of aspiration   Pt was very confused and distracted by family in the room, therefore cooperation was reduced  Pt appeared to fatigue quickly during assessment  Risk for Aspiration: mild-moderate     Recommendations: mechanically altered/level 2 diet and nectar thick liquids (NTL by cup only, no straws!)    Recommended Form of Meds: whole with thick liquid, or whole in applesauce     Aspiration precautions and compensatory swallowing strategies: upright posture, only feed when fully alert, slow rate of feeding, small bites/sips, no straws, quiet environment (tv off, limit talking, door closed, etc ) and assist pt with feeding (low vision due to macular degeneration)    Results Reviewed with: patient, RN, family and sign with aspiration precautions posted in room     Treatment Recommendations: ST tx for dysphagia     Dysphagia Goals:   1  Pt will tolerate dysphagia 2 diet and NTL without s/s of aspiration during 100% of meals and snacks  2  Pt will tolerate least restrictive diet without s/s of aspiration  Discharge recommendation: SNF      Speech Therapy Prognosis   Prognosis: fair    Prognosis Considerations: age, medical status, cognitive status and therapeutic potential      Plan  ST to see pt for dysphagia tx

## 2018-01-21 NOTE — PROGRESS NOTES
[facility] Progress Note  Patient: Jcarlos Leone 80 y o  female   MRN: [id number]  PCP: No primary care provider on file  Unit/Bed#: -01 Encounter: [id number]  Date Of Visit: 01/21/18    Assessment:    Active Problems:    Anxiety and depression    Ambulatory dysfunction    Dementia    Encephalopathy      Plan: Altered mental status  Likely 2/2 recent hip fracture and recovering from this   Stable if not a little improved today         Ambulatory dysfunction  S/p hip fracture repair on last admission  PT evaluation - family do not want her sent home until she is mobile or "walking"         Positive blood cultures  1 bottle has flagged positive for GPC   Likely contaminant but given that patient was just in the ER and sent home yesterday, and was recently discharged, will follow up on these and consult ID       Family are very concerned why "she keeps getting sent back"        Dementia  At baseline patient is confused  Can remember her son's name, but only when prompted  Recently though she has been engaging less than what is normal for her         Anxiety  C/w citalopram          VTE Pharmacologic Prophylaxis:   Pharmacologic: Heparin  Mechanical VTE Prophylaxis in Place: Yes    Patient Centered Rounds: I have performed bedside rounds with nursing staff today  Discussions with Specialists or Other Care Team Provider: Discussed with ID Dr Pete Sierra - agree with no antibiotics  Education and Discussions with Family / Patient: I did explain to patient, however she is confused and not receptive  I did speak to Rickkellyarlene Olegario, patient's daughter in law and updated her  Time Spent for Care: 30 minutes  More than 50% of total time spent on counseling and coordination of care as described above      Current Length of Stay: 1 day(s)    Current Patient Status: Inpatient   Certification Statement: The patient will continue to require additional inpatient hospital stay due to Awaiting blood cultures and PT evaluation  Discharge Plan / Estimated Discharge Date: Not medically stable for DC  Possibly tomorrow  Code Status: Level 3 - DNAR and DNI      Subjective:   Patient seen and examined  No new symptoms      " I can't stay here, I need to get back to work "    "it's the middle of the work week"  Appears comfortable  Objective:     Vitals:   Temp (24hrs), Av 2 °F (36 8 °C), Min:97 8 °F (36 6 °C), Max:98 4 °F (36 9 °C)    HR:  [] 98  Resp:  [20-22] 20  BP: (120-155)/(50-70) 130/62  SpO2:  [93 %-98 %] 95 %  Body mass index is 21 06 kg/m²  Input and Output Summary (last 24 hours): Intake/Output Summary (Last 24 hours) at 18 1502  Last data filed at 18 1034   Gross per 24 hour   Intake                0 ml   Output              592 ml   Net             -592 ml       Physical Exam:     Physical Exam   Constitutional: She appears well-developed and well-nourished  Eyes: Right eye exhibits no discharge  Left eye exhibits no discharge  No scleral icterus  Neck: No JVD present  No tracheal deviation present  No thyromegaly present  Cardiovascular:   Murmur heard  Pulmonary/Chest: No respiratory distress  She has no wheezes  She has no rales  She exhibits no tenderness  Abdominal: She exhibits no distension and no mass  There is no tenderness  There is no rebound and no guarding  Musculoskeletal: She exhibits no edema, tenderness or deformity  Lymphadenopathy:     She has no cervical adenopathy  Neurological: She is alert  She displays normal reflexes  No cranial nerve deficit  She exhibits normal muscle tone  Coordination normal    Skin: There is pallor           Additional Data:     Labs:      Results from last 7 days  Lab Units 18  0536 18  1303  18  2042   WBC Thousand/uL 7 68 9 46  < > 18 67*   HEMOGLOBIN g/dL 8 4* 9 0*  < > 11 3*   HEMATOCRIT % 27 4* 28 6*  < > 36 0   PLATELETS Thousands/uL 424* 381  < > 381   NEUTROS PCT % --   --   --  80*   LYMPHS PCT %  --   --   --  8*   LYMPHO PCT %  --  9*  < >  --    MONOS PCT %  --   --   --  11   MONO PCT MAN %  --  8  < >  --    EOS PCT %  --   --   --  1   EOSINO PCT MANUAL %  --  1  < >  --    < > = values in this interval not displayed  Results from last 7 days  Lab Units 01/21/18  0536   SODIUM mmol/L 140   POTASSIUM mmol/L 3 5   CHLORIDE mmol/L 107   CO2 mmol/L 24   BUN mg/dL 20   CREATININE mg/dL 0 75   CALCIUM mg/dL 8 0*   TOTAL PROTEIN g/dL 5 5*   BILIRUBIN TOTAL mg/dL 1 10*   ALK PHOS U/L 69   ALT U/L 8*   AST U/L 17   GLUCOSE RANDOM mg/dL 96       Results from last 7 days  Lab Units 01/19/18  1050   INR  1 55*       * I Have Reviewed All Lab Data Listed Above  * Additional Pertinent Lab Tests Reviewed: All Labs For Current Hospital Admission Reviewed    Imaging:    Imaging Reports Reviewed Today Include:   CXR -   "No active pulmonary disease "  Imaging Personally Reviewed by Myself Includes:    As above  Recent Cultures (last 7 days):       Results from last 7 days  Lab Units 01/19/18  1156 01/19/18  1119 01/19/18  1051   BLOOD CULTURE   --    Staphylococcus coagulase negative* No Growth at 48 hrs  GRAM STAIN RESULT   --  Gram positive cocci in clusters  --    URINE CULTURE  No Growth <1000 cfu/mL  --   --        Last 24 Hours Medication List:     acetaminophen 975 mg Oral TID   citalopram 20 mg Oral Daily   enoxaparin 30 mg Subcutaneous Daily   levothyroxine 25 mcg Oral Early Morning   melatonin 3 mg Oral HS   pantoprazole 40 mg Oral Early Morning   polyvinyl alcohol 1 drop Both Eyes TID   sucralfate 1 g Oral 4x Daily        Today, Patient Was Seen By: Matti Meeks MD    ** Please Note: This note has been constructed using a voice recognition system   **

## 2018-01-21 NOTE — CONSULTS
Consultation - Infectious Disease   Chela Marsh 80 y o  female MRN: [id number]  Unit/Bed#: -01 Encounter: [id number]      IMPRESSION & RECOMMENDATIONS:   Impression/Recommendations: This is a 80 y o  female, with underlying dementia, was recently admitted for right hip fracture, now status post right hip hemiarthroplasty, was seen in the ER yesterday with dyspnea  One 0 for 2 admission blood cultures drawn then is now positive for GPC in clusters  Patient is clinically and systemically well  1   Bacteremia  Only 1 out of 2 blood cultures drawn in the ER yesterday is positive for GPC in clusters  Patient is clinically and systemically well  She has no indwelling intravenous catheter or foreign body  This is most likely contaminant  No antibiotic necessary  No antibiotic needed for this  Follow-up on final ID of GPC in [facility]  Follow-up on 2nd blood culture from yesterday  Follow-up on repeat blood cultures from today  2   Encephalopathy  I suspect this is secondary to dementia, exacerbated by acute illness (hip fracture)  This would be quite expected  No clinical signs of CNS infection  Monitor mental status  Supportive care  3   Dyspnea, evaluated in the ER yesterday  CXR without infiltrates  Dyspnea appears resolved  Monitor respiratory symptoms  4   Mild leukocytosis during recent admission  This is most likely stress response from hip fracture  WBC has normalized now  Monitor WBC  5  Right hip fracture secondary to fall  Patient is status post hemiarthroplasty  No clinical signs of cellulitis  Serial incision exams  Recent hospitalization records reviewed in detail  Patient had low level leukocytosis during admission but no fever  Discussed with the patient in detail regarding the above plan  Thank you for this consultation  We will follow along with you  HISTORY OF PRESENT ILLNESS:  Reason for Consult:  Bacteremia      HPI: Chela Marsh is jeremy 80 y o  female , with underlying dementia, was sent from a SNF to the ER yesterday with generalized fatigue and SOB  CXR was negative  Patient with discharge back to SNF  Blood cultures were obtained  Today, patient was notified to come back to the ER when 1 of the 2 blood cultures from yesterday came back positive  She was admitted  We asked to evaluate the patient  As stated above, patient has underlying dementia  She was recently admitted from  until  with right hip fracture status post fall  She had a right hip hemiarthroplasty done  Apparently, according to family, patient has not been herself since the hip fracture  At present, patient is comfortable  She only has mild right hip pain  No chills  She is clearly disoriented, and unable to provide meaningful or reliable history  REVIEW OF SYSTEMS:  A complete 12 point system-based review of systems is otherwise negative  PAST MEDICAL HISTORY:  Past Medical History:   Diagnosis Date    Anxiety     Depression     GERD (gastroesophageal reflux disease)     Lumbago     Osteoporosis     Thrombocytopenia ([address] )      Past Surgical History:   Procedure Laterality Date    CO PARTIAL HIP REPLACEMENT Right 2018    Procedure: Right Hip Hemiarthroplasty;  Surgeon: Prabhakar Cleary DO;  [facility];  Service: Orthopedics     Problem list reviewed  FAMILY HISTORY:  Non-contributory    SOCIAL HISTORY:  History   Alcohol Use No     History   Drug Use No     History   Smoking Status    Never Smoker   Smokeless Tobacco    Never Used       ALLERGIES:  No Known Allergies    MEDICATIONS:  All current active medications have been reviewed  Patient is currently not on antibiotic      PHYSICAL EXAM:  Vitals:  HR:  [] 103  Resp:  [20] 20  BP: (120-161)/(50-72) 120/50  SpO2:  [93 %-100 %] 96 %  Temp (24hrs), Av 5 °F (36 9 °C), Min:98 2 °F (36 8 °C), Max:98 8 °F (37 1 °C)  Current: Temperature: 98 2 °F (36 8 °C)     Physical Exam:  General:  Chronically ill-appearing, comfortable, nontoxic, in no acute distress  Awake, alert but disoriented  Eyes:  Conjunctive clear with no hemorrhages or effusions  Oropharynx:  No ulcers, no lesions, pharynx benign, no tonsillitis  Neck:  Supple, no lymphadenopathy, no mass, nontender  Lungs:  Decreased breath sounds, no rales, no wheezing, no accessory muscle use  Cardiac:  Tachycardic with regular rhythm, normal S1, normal S2, III/VI FARIHA  Abdomen:  Soft, nondistended, non-tender, no HSM  Extremities:  No edema, no erythema, nontender  No ulcers  Incision clean  Skin:  No rashes, no ulcers  Neurological:  Moves all four extremities spontaneously, sensation grossly intact    LABS, IMAGING, & OTHER STUDIES:  Lab Results:  I have personally reviewed pertinent labs  Results from last 7 days  Lab Units 01/20/18  1303 01/19/18  1050 01/18/18  0455  01/15/18  0510   SODIUM mmol/L 140 138 133*  < > 136   POTASSIUM mmol/L 4 2 4 4 4 4  < > 4 1   CHLORIDE mmol/L 107 105 103  < > 102   CO2 mmol/L 26 24 21  < > 27   ANION GAP mmol/L 7 9 9  < > 7   BUN mg/dL 20 27* 25  < > 18   CREATININE mg/dL 0 77 1 07 1 13  < > 1 01   EGFR ml/min/1 73sq m 66 44 41  < > 47   GLUCOSE RANDOM mg/dL 129 186* 117  < > 124   CALCIUM mg/dL 8 3 8 2* 8 0*  < > 8 2*   AST U/L 17 12  --   --  20   ALT U/L 13 12  --   --  25   ALK PHOS U/L 72 72  --   --  72   TOTAL PROTEIN g/dL 5 7* 5 6*  --   --  6 6   ALBUMIN g/dL 2 1* 2 0*  --   --  3 4*   BILIRUBIN TOTAL mg/dL 1 00 0 90  --   --  0 80   < > = values in this interval not displayed      Results from last 7 days  Lab Units 01/20/18  1303 01/19/18  1050 01/18/18  0455   WBC Thousand/uL 9 46 10 49* 13 10*   HEMOGLOBIN g/dL 9 0* 9 2* 9 3*   PLATELETS Thousands/uL 381 303 234       Results from last 7 days  Lab Units 01/19/18  1156 01/19/18  1119 01/19/18  1051 01/14/18  2321   BLOOD CULTURE   --   --  No Growth at 24 hrs   --    GRAM STAIN RESULT   --  Gram positive cocci in clusters  -- --    URINE CULTURE  No Growth <1000 cfu/mL  --   --   --    MRSA CULTURE ONLY   --   --   --  No Methicillin Resistant Staphlyococcus aureus (MRSA) isolated       Imaging Studies:   I have personally reviewed pertinent imaging study reports and images in PACS  CXR reviewed personally  No infiltrates or consolidations  EKG, Pathology, and Other Studies:   I have personally reviewed pertinent reports

## 2018-01-21 NOTE — PLAN OF CARE
Problem: SLP ADULT - SWALLOWING, IMPAIRED  Goal: Initial SLP swallow eval performed  Outcome: Completed Date Met: 01/21/18

## 2018-01-21 NOTE — PLAN OF CARE
MUSCULOSKELETAL - ADULT     Maintain or return mobility to safest level of function Progressing     Maintain proper alignment of affected body part Progressing        Potential for Falls     Patient will remain free of falls Progressing        Prexisting or High Potential for Compromised Skin Integrity     Skin integrity is maintained or improved Progressing

## 2018-01-21 NOTE — PROGRESS NOTES
Progress Note - Infectious Disease   Herrera Walker 80 y o  female MRN: [id number]  Unit/Bed#: -01 Encounter: [id number]      Impression/Recommendations:  1  Coagulase-negative Staphylococcus bacteremia  Only 1 out of 2 blood cultures drawn in the ER  is positive  Patient is clinically and systemically well  She has no indwelling intravenous catheter or foreign body  This is contaminant  No antibiotic necessary  Repeat blood cultures are negative so far  No antibiotic needed for this  No further workup necessary      2   Encephalopathy on a background of dementia  I suspect this is secondary to dementia, exacerbated by acute illness (hip fracture)  This would be quite expected  No clinical signs of CNS infection  Monitor mental status  Supportive care      3  Dyspnea, evaluated in the ER yesterday  CXR without infiltrates  Dyspnea resolved  Monitor respiratory symptoms      4   Mild leukocytosis during recent admission  This is most likely stress response from hip fracture  WBC has normalized now  Monitor WBC      5  Right hip fracture secondary to fall  Patient is status post hemiarthroplasty  No clinical signs of cellulitis  Serial incision exams      Discussed with the patient in detail regarding the above plan  Discussed with slim service  Given no active infection, I will sign off  Thank you for the consultation  Please do not hesitate to reconsult us for any questions or issues  Antibiotics:  None    Subjective:  Patient is comfortable  Mental status baseline  Temperature stays down  No chills  Objective:  Vitals:  HR:  [] 100  Resp:  [20-22] 22  BP: (120-161)/(50-72) 140/70  SpO2:  [93 %-100 %] 95 %  Temp (24hrs), Av 4 °F (36 9 °C), Min:98 2 °F (36 8 °C), Max:98 8 °F (37 1 °C)  Current: Temperature: 98 2 °F (36 8 °C)    Physical Exam:     General: Awake, alert, cooperative, no distress  Stable confusion/disorientation     Lungs: Expansion symmetric, no rales, no wheezing, respirations unlabored  Heart[de-identified]  Mildly tachycardic with regular rhythm, S1 and S2 normal, no murmur  Abdomen: Soft, nondistended, non-tender, bowel sounds active all four quadrants,        no masses, no organomegaly  Extremities: No edema  No erythema/warmth  No ulcer  Nontender to palpation  Skin:  No rash  Invasive Devices     Peripheral Intravenous Line            Peripheral IV 01/20/18 Left Antecubital less than 1 day                Labs studies:   I have personally reviewed pertinent labs  Results from last 7 days  Lab Units 01/21/18  0536 01/20/18  1303 01/19/18  1050   SODIUM mmol/L 140 140 138   POTASSIUM mmol/L 3 5 4 2 4 4   CHLORIDE mmol/L 107 107 105   CO2 mmol/L 24 26 24   ANION GAP mmol/L 9 7 9   BUN mg/dL 20 20 27*   CREATININE mg/dL 0 75 0 77 1 07   EGFR ml/min/1 73sq m 68 66 44   GLUCOSE RANDOM mg/dL 96 129 186*   CALCIUM mg/dL 8 0* 8 3 8 2*   AST U/L 17 17 12   ALT U/L 8* 13 12   ALK PHOS U/L 69 72 72   TOTAL PROTEIN g/dL 5 5* 5 7* 5 6*   ALBUMIN g/dL 1 9* 2 1* 2 0*   BILIRUBIN TOTAL mg/dL 1 10* 1 00 0 90       Results from last 7 days  Lab Units 01/21/18  0536 01/20/18  1303 01/19/18  1050   WBC Thousand/uL 7 68 9 46 10 49*   HEMOGLOBIN g/dL 8 4* 9 0* 9 2*   PLATELETS Thousands/uL 424* 381 303       Results from last 7 days  Lab Units 01/19/18  1156 01/19/18  1119 01/19/18  1051 01/14/18  2321   BLOOD CULTURE   --    Staphylococcus coagulase negative* No Growth at 24 hrs   --    GRAM STAIN RESULT   --  Gram positive cocci in clusters  --   --    URINE CULTURE  No Growth <1000 cfu/mL  --   --   --    MRSA CULTURE ONLY   --   --   --  No Methicillin Resistant Staphlyococcus aureus (MRSA) isolated       Imaging Studies:   I have personally reviewed pertinent imaging study reports and images in PACS  EKG, Pathology, and Other Studies:   I have personally reviewed pertinent reports

## 2018-01-22 PROCEDURE — G8978 MOBILITY CURRENT STATUS: HCPCS

## 2018-01-22 PROCEDURE — G8979 MOBILITY GOAL STATUS: HCPCS

## 2018-01-22 PROCEDURE — 94640 AIRWAY INHALATION TREATMENT: CPT

## 2018-01-22 PROCEDURE — 97116 GAIT TRAINING THERAPY: CPT

## 2018-01-22 PROCEDURE — 94664 DEMO&/EVAL PT USE INHALER: CPT

## 2018-01-22 PROCEDURE — G8988 SELF CARE GOAL STATUS: HCPCS

## 2018-01-22 PROCEDURE — 94760 N-INVAS EAR/PLS OXIMETRY 1: CPT

## 2018-01-22 PROCEDURE — 97167 OT EVAL HIGH COMPLEX 60 MIN: CPT

## 2018-01-22 PROCEDURE — 97163 PT EVAL HIGH COMPLEX 45 MIN: CPT

## 2018-01-22 PROCEDURE — G8987 SELF CARE CURRENT STATUS: HCPCS

## 2018-01-22 PROCEDURE — 97110 THERAPEUTIC EXERCISES: CPT

## 2018-01-22 RX ORDER — ALBUTEROL SULFATE 2.5 MG/3ML
2.5 SOLUTION RESPIRATORY (INHALATION) EVERY 6 HOURS PRN
Status: DISCONTINUED | OUTPATIENT
Start: 2018-01-22 | End: 2018-01-24 | Stop reason: HOSPADM

## 2018-01-22 RX ORDER — LEVALBUTEROL 1.25 MG/.5ML
1.25 SOLUTION, CONCENTRATE RESPIRATORY (INHALATION) EVERY 8 HOURS PRN
Status: DISCONTINUED | OUTPATIENT
Start: 2018-01-22 | End: 2018-01-22

## 2018-01-22 RX ADMIN — ACETAMINOPHEN 975 MG: 325 TABLET, FILM COATED ORAL at 21:07

## 2018-01-22 RX ADMIN — LEVOTHYROXINE SODIUM 25 MCG: 25 TABLET ORAL at 06:25

## 2018-01-22 RX ADMIN — POLYVINYL ALCOHOL 1 DROP: 14 SOLUTION/ DROPS OPHTHALMIC at 21:07

## 2018-01-22 RX ADMIN — ACETAMINOPHEN 975 MG: 325 TABLET, FILM COATED ORAL at 16:50

## 2018-01-22 RX ADMIN — SUCRALFATE 1 G: 1 TABLET ORAL at 18:51

## 2018-01-22 RX ADMIN — POLYVINYL ALCOHOL 1 DROP: 14 SOLUTION/ DROPS OPHTHALMIC at 09:39

## 2018-01-22 RX ADMIN — ALBUTEROL SULFATE 2.5 MG: 2.5 SOLUTION RESPIRATORY (INHALATION) at 18:26

## 2018-01-22 RX ADMIN — PANTOPRAZOLE SODIUM 40 MG: 40 TABLET, DELAYED RELEASE ORAL at 06:25

## 2018-01-22 RX ADMIN — SUCRALFATE 1 G: 1 TABLET ORAL at 09:39

## 2018-01-22 RX ADMIN — MELATONIN 3 MG: 3 TAB ORAL at 21:07

## 2018-01-22 RX ADMIN — POLYVINYL ALCOHOL 1 DROP: 14 SOLUTION/ DROPS OPHTHALMIC at 16:54

## 2018-01-22 RX ADMIN — SUCRALFATE 1 G: 1 TABLET ORAL at 21:07

## 2018-01-22 RX ADMIN — ENOXAPARIN SODIUM 30 MG: 40 INJECTION SUBCUTANEOUS at 09:38

## 2018-01-22 RX ADMIN — SUCRALFATE 1 G: 1 TABLET ORAL at 12:16

## 2018-01-22 RX ADMIN — ACETAMINOPHEN 975 MG: 325 TABLET, FILM COATED ORAL at 09:39

## 2018-01-22 RX ADMIN — SODIUM CHLORIDE 50 ML/HR: 0.9 INJECTION, SOLUTION INTRAVENOUS at 16:09

## 2018-01-22 RX ADMIN — CITALOPRAM HYDROBROMIDE 20 MG: 20 TABLET ORAL at 09:39

## 2018-01-22 NOTE — PLAN OF CARE
Problem: PHYSICAL THERAPY ADULT  Goal: Performs mobility at highest level of function for planned discharge setting  See evaluation for individualized goals  Treatment/Interventions: Functional transfer training, LE strengthening/ROM, Therapeutic exercise, Endurance training, Patient/family training, Equipment eval/education, Bed mobility, Gait training, Cognitive reorientation, Spoke to nursing, Spoke to case management, OT  Equipment Recommended: Patel Hernandez       See flowsheet documentation for full assessment, interventions and recommendations  Prognosis: Fair  Problem List: Decreased strength, Decreased range of motion, Decreased endurance, Impaired balance, Decreased mobility, Decreased cognition, Decreased safety awareness, Impaired vision, Impaired hearing, Pain, Orthopedic restrictions, Decreased coordination (gait deviations)  Assessment: Pt is a 80 y o  female seen for PT evaluation s/p admit to [facility]/20[facility] w/r/o sepsis (+ blood cultures)  Pt recently here last week s/p fall and had Right Hip Hemiarthroplasty last week  for femoral neck fracture  WBAT RLE, PT/OT, Posterior Hip Precautions  Pt also in ED last Friday  Order placed for PT  Prior to admission, pt was a long term resident of SNF and used rolling walker for mobility  Upon evaluation: Pt requires 2-person assistance for transfers and ambulation with rolling walker  Pt has made substantial progress from last admission PT eval, and nursing was able to assist pt OOB w/ A of 2 today    Pt's clinical presentation is currently unstable/unpredictable given the functional mobility deficits above, especially weakness, impaired balance, decreased endurance, impaired coordination, gait deviations, pain, decreased functional mobility tolerance, decreased safety awareness, fall risk, orthopedic restrictions and decreased cognition, combined with medical complications of abnormal H&H, multiple readmissions, history of falls and abnormal platelet values  Pt to benefit from continued skilled PT tx while in hospital and upon DC to address deficits as defined above and maximize level of functional mobility  From PT/mobility standpoint, recommendation at time of d/c would be inpatient rehab and with rolling walker pending progress in order to maximize pt's functional independence and consistency w/ mobility in order to facilitate return to PLOF  Recommend trial with walker next 1-2 sessions, trial with WC next 1-2 sessions, ther ex next 1-2 session and case management consult  Recommendation: Post acute IP rehab, Rehab consult          See flowsheet documentation for full assessment

## 2018-01-22 NOTE — PROGRESS NOTES
[facility] Progress Note  Patient: Tg Meo 80 y o  female   MRN: [id number]  PCP: No primary care provider on file  Unit/Bed#: -01 Encounter: [id number]  Date Of Visit: 01/22/18    Assessment:    Active Problems:    Anxiety and depression    Ambulatory dysfunction    Dementia    Encephalopathy      Plan: Altered mental status  Likely 2/2 recent hip fracture and recovering from this   Stable if not a little improved today, may be at baseline          Ambulatory dysfunction  S/p hip fracture repair on last admission  PT evaluation - family do not want her sent home until she is mobile or "walking"  PT eval done- see their note for recs          Positive blood cultures  1 bottle has flagged positive for GPC   Likely contaminant but given that patient was just in the ER and sent home yesterday, and was recently discharged, will follow up on these and consult ID       Family are very concerned why "she keeps getting sent back"  Follow up cultures are negative at 24 hours  ID signed off          Dementia  At baseline patient is confused  Can remember her son's name, but only when prompted  Recently though she has been engaging less than what is normal for her          Anxiety  C/w citalopram    Diarrhea  Patient had 2x episodes of loose stools  Culture sent              VTE Pharmacologic Prophylaxis:   Pharmacologic: Heparin  Mechanical VTE Prophylaxis in Place: Yes    Patient Centered Rounds: I have performed bedside rounds with nursing staff today  Discussions with Specialists or Other Care Team Provider: Discussed with ID  Also discussed with CM and PT and they are aware of barriers  Education and Discussions with Family / Patient: Mattie Danielson to call patients son on number which I have reached him on in the past, no answer  Time Spent for Care: 30 minutes    More than 50% of total time spent on counseling and coordination of care as described above     Current Length of Stay: 2 day(s)    Current Patient Status: Inpatient   Certification Statement: The patient will continue to require additional inpatient hospital stay due to diarrhea, monitor  Discharge Plan / Estimated Discharge Date: likely medically cleared if culture is negative or no further diarrhea tomorrow  Code Status: Level 3 - DNAR and DNI      Subjective:   Patient seen and examined      "i feel fine thank you"    No complaints  Appears comfortable  Objective:     Vitals:   Temp (24hrs), Av 1 °F (36 7 °C), Min:98 °F (36 7 °C), Max:98 1 °F (36 7 °C)    HR:  [] 107  Resp:  [20-22] 22  BP: (130-142)/(60-68) 142/68  SpO2:  [95 %-98 %] 98 %  Body mass index is 21 06 kg/m²  Input and Output Summary (last 24 hours): Intake/Output Summary (Last 24 hours) at 18 1608  Last data filed at 18 1105   Gross per 24 hour   Intake          2069 17 ml   Output              639 ml   Net          1430 17 ml       Physical Exam:     Physical Exam   Constitutional:   Frail  Eyes: Right eye exhibits no discharge  Left eye exhibits no discharge  No scleral icterus  Neck: No JVD present  No tracheal deviation present  No thyromegaly present  Cardiovascular: Exam reveals no gallop and no friction rub  Murmur heard  Pulmonary/Chest: No respiratory distress  She has no wheezes  She has no rales  She exhibits no tenderness  Abdominal: She exhibits no distension and no mass  There is no tenderness  There is no rebound and no guarding  Musculoskeletal: She exhibits no edema, tenderness or deformity  Lymphadenopathy:     She has no cervical adenopathy  Neurological: She is alert  No cranial nerve deficit  Coordination normal    Psychiatric:   Confused              Additional Data:     Labs:      Results from last 7 days  Lab Units 18  0536 18  1303   WBC Thousand/uL 7 68 9 46   HEMOGLOBIN g/dL 8 4* 9 0*   HEMATOCRIT % 27 4* 28 6*   PLATELETS Thousands/uL 424* 381   LYMPHO PCT %  --  9*   MONO PCT MAN %  --  8   EOSINO PCT MANUAL %  --  1       Results from last 7 days  Lab Units 01/21/18  0536   SODIUM mmol/L 140   POTASSIUM mmol/L 3 5   CHLORIDE mmol/L 107   CO2 mmol/L 24   BUN mg/dL 20   CREATININE mg/dL 0 75   CALCIUM mg/dL 8 0*   TOTAL PROTEIN g/dL 5 5*   BILIRUBIN TOTAL mg/dL 1 10*   ALK PHOS U/L 69   ALT U/L 8*   AST U/L 17   GLUCOSE RANDOM mg/dL 96       Results from last 7 days  Lab Units 01/19/18  1050   INR  1 55*       * I Have Reviewed All Lab Data Listed Above  * Additional Pertinent Lab Tests Reviewed: All Labs For Current Hospital Admission Reviewed    Imaging:    Imaging Reports Reviewed Today Include:   CXR -  "No active pulmonary disease "  Imaging Personally Reviewed by Myself Includes:    As above  Recent Cultures (last 7 days):       Results from last 7 days  Lab Units 01/20/18  1304 01/20/18  1303 01/19/18  1156 01/19/18  1119 01/19/18  1051   BLOOD CULTURE  No Growth at 24 hrs  No Growth at 24 hrs   --    Staphylococcus coagulase negative* No Growth at 72 hrs  GRAM STAIN RESULT   --   --   --  Gram positive cocci in clusters  --    URINE CULTURE   --   --  No Growth <1000 cfu/mL  --   --        Last 24 Hours Medication List:     acetaminophen 975 mg Oral TID   citalopram 20 mg Oral Daily   enoxaparin 30 mg Subcutaneous Daily   levothyroxine 25 mcg Oral Early Morning   melatonin 3 mg Oral HS   pantoprazole 40 mg Oral Early Morning   polyvinyl alcohol 1 drop Both Eyes TID   sucralfate 1 g Oral 4x Daily        Today, Patient Was Seen By: Davis Caballero MD    ** Please Note: This note has been constructed using a voice recognition system   **

## 2018-01-22 NOTE — OCCUPATIONAL THERAPY NOTE
[address] Evaluation     Patient Name: Ruy Arana  Today's Date: 1/22/2018  Problem List  Patient Active Problem List   Diagnosis    Anxiety and depression    GERD (gastroesophageal reflux disease)    Closed fracture of neck of right femur ([address] )    Ambulatory dysfunction    Closed displaced fracture of right femoral neck (HCC)    Dementia    Murmur    Blood loss anemia    Elevated lactic acid level    Encephalopathy     Past Medical History  Past Medical History:   Diagnosis Date    Anxiety     Depression     GERD (gastroesophageal reflux disease)     Lumbago     Osteoporosis     Thrombocytopenia (HCC)      Past Surgical History  Past Surgical History:   Procedure Laterality Date    MD PARTIAL HIP REPLACEMENT Right 1/16/2018    Procedure: Right Hip Hemiarthroplasty;  Surgeon: Michael Cary DO;  [facility]: [facility];  Service: Orthopedics      01/22/18 1322   Note Type   Note type Eval/Treat   Restrictions/Precautions   Weight Bearing Precautions Per Order Yes   RLE Weight Bearing Per Order WBAT   Other Precautions Contact/isolation;Cognitive; Chair Alarm; Bed Alarm;WBS;THR;Fall Risk;O2;Pain   Pain Assessment   Pain Assessment FLACC   Pain Score 5   Pain Location Hip   Pain Orientation Right   Pain Rating: FLACC (Rest) - Face 0   Pain Rating: FLACC (Rest) - Legs 0   Pain Rating: FLACC (Rest) - Activity 0   Pain Rating: FLACC (Rest) - Cry 0   Pain Rating: FLACC (Rest) - Consolability 0   Score: FLACC (Rest) 0   Pain Rating: FLACC (Activity) - Face 1   Pain Rating: FLACC (Activity) - Legs 1   Pain Rating: FLACC (Activity) - Activity 1   Pain Rating: FLACC (Activity) - Cry 1   Pain Rating: FLACC (Activity) - Consolability 1   Score: FLACC (Activity) 5   Home Living   Type of Home Other (Comment)  ([place])   Home Layout Performs ADLs on one level   Home Equipment Walker   Additional Comments Pt not accurate historian at baseline due to dementia   Therapist spoke w/ pt's nurse Abbie Madrigal on the phone who reports that at baseline prior to R hip surgery pt ambulated using RW, and was able to dress herself after set- up  Pt completing toileting w/ mod I   Prior Function   Level of [place] Needs assistance with IADLs   Lives With Facility staff   Receives Help From Personal care attendant   ADL Assistance Needs assistance   IADLs Needs assistance   Falls in the last 6 months 1 to 4   Vocational Retired   Comments Prior to this admission, pt required assistance w/ ADL and functional transfers  Lifestyle   Autonomy At baseline, pt used RW for functional mobility, and completing self care ADL's after set- up  Pt needs assistance w/ IADL   Reciprocal Relationships Pt reports supportive and involved family   Service to Others Pt reports retired and added that she worked in [address] and in [employer] Pt reports enjoying watching tv  Pt added that there is nothing else to do during the day   ADL   Eating Assistance 5  Supervision/Setup   Eating Deficit Setup; Increased time to complete;Supervision/safety   Grooming Assistance 5  Supervision/Setup  (seated in chait to comb hair)   Grooming Deficit Setup   UB Bathing Assistance Unable to assess   LB Bathing Assistance 2  Maximal Assistance    [address] Unable to assess   LB Dressing Assistance 1  Total Assistance   LB Dressing Deficit Don/doff R sock; Don/doff L sock   Toileting Assistance  2  Maximal Assistance   Toileting Deficit Bedside commode;Perineal hygiene;Setup;Supervison/safety; Increased time to complete   Bed Mobility   Supine to Sit Unable to assess   Sit to Supine Unable to assess   Additional Comments Pt seated OOB in chair upon therapist arrival and in chair w/ PT, Hugh galindo eval    Transfers   Sit to Stand 3  Moderate assistance   Additional items Assist x 2; Increased time required;Verbal cues;Armrests   Stand to Sit 3  Moderate assistance   Additional items Assist x 2; Increased time required;Verbal cues;Armrests   Toilet transfer 3  Moderate assistance   Additional items Assist x 2;Commode; Increased time required;Verbal cues   Balance   Static Sitting Fair +   Static Standing Poor   Activity Tolerance   Activity Tolerance Patient limited by pain   Medical Staff Made Aware spoke to PT, [facility]   Nurse Made Aware spoke to RNNicki Assessment   RUE Assessment WFL  (limited end range shoulder; able to complete ADL)   RUE Strength   RUE Overall Strength Within Functional Limits - able to perform ADL tasks with strength   LUE Assessment   LUE Assessment WFL  (limited end range shoulder; able to complete ADL)   LUE Strength   LUE Overall Strength Within Functional Limits - able to perform ADL tasks with strength   Cognition   Overall Cognitive Status Impaired   Arousal/Participation Alert; Cooperative   Attention Attends with cues to redirect   Orientation Level Oriented to person;Disoriented to situation;Disoriented to time;Disoriented to place   Memory Decreased recall of precautions;Decreased recall of recent events;Decreased short term memory;Decreased recall of biographical information   Following Commands Follows one step commands with increased time or repetition   Comments Pt agreed to participate in OT eval  Pt indetified by full name and birthdate  Pt demonstrated decreased recall and carryover of precautions  Pt disoriented to time, place, and situation  Assessment   Limitation Decreased ADL status; Decreased Safe judgement during ADL;Decreased cognition;Decreased endurance;Decreased self-care trans   Assessment Pt is a 81yo female [facility]  Pt presents w/ encephalopathy and signficant PMH impacting her occupational performance including dementia, closed fracture right femoral neck, and s/p R hip hemiarthroplasty 1/16/2018  Pt recently discharge to [place] from acute care, and returned to ED  Pt presents WBAT R LE  Pt unable to recall hip precautions   Pt completed sit to stand w/ mod A x2 from chair to RW  Pt complete functional transfers using RW to commode w/ mod A x2/ Pt completed toileting w/ max A  Pt demosntrated B UE AROM WFL to complete ADL  Pt pleasant and cooperative throughout eval, but disoriented to time, place, and situation  Pt presents w/ decreased standing tolerance, decreased standing balalnce, decreased dynamic sitting balance, decreased memory / recall, and generalized weaknes impacting her I w/ dressing, bathing, oral hygiene, functional mobility, functional transfers, clothing mgmt  Pt would benefit from OT while in acute care to address deficits  From an OT perspective, pt would benefit from post acute rehab when medically stable for discharge from acute care  Will continue to follow pt in acute care   Goals   Patient Goals none stated   Plan   Treatment Interventions ADL retraining;Functional transfer training;UE strengthening/ROM; Cognitive reorientation;Patient/family training;Equipment evaluation/education;Continued evaluation; Activityengagement   Goal Expiration Date 01/29/18   OT Frequency 3-5x/wk   Recommendation   OT Discharge Recommendation Other (Comment)  (post acute rehab)   Equipment Recommended Bedside commode;Tub seat with back   OT - OK to Discharge (to post acute rehab when medically stable)   Barthel Index   Feeding 5   Bathing 0   Grooming Score 5   Dressing Score 5   Bladder Score 0   Bowels Score 0   Toilet Use Score 5   Transfers (Bed/Chair) Score 5   Mobility (Level Surface) Score 0   Stairs Score 0   Barthel Index Score 25   Modified Dayday Scale   Modified Bibb Scale 4    Pt goals to be met in 7 days:  1  Pt will complete UBD w/ mod I after set- up  2  Pt will complete LBD w/ min A using AE  3  Pt will demonstrate good attention and verbalize understanding of AE to max I w/ LB ADL  4  Pt will demonstrate understanding of use of AE w/ min A and min cues   5   Pt will complete grooming w/ mod I after set- up seated in chair  6  Pt will complete functional transfers to bed, chair, and commode using AD w/ min A x1  7  Pt will demonstrate good attention and participation in continued evaluation to assist in safe discharge planning  8   Pt will demonstrate good sitting balance at EOB to actively participate in ADL after set- up and will tolerate sitting for at least 10 minutes  Ana Bales, OT

## 2018-01-22 NOTE — PHYSICAL THERAPY NOTE
PHYSICAL THERAPY EVALUATION  NAME:  [facility]  DATE: 01/22/18    AGE:   89 y o  Mrn:   [id number]  ADMIT DX:  Weakness [R53 1]  Positive blood cultures [R78 81]  Positive blood culture [R78 81]    Past Medical History:   Diagnosis Date    Anxiety     Depression     GERD (gastroesophageal reflux disease)     Lumbago     Osteoporosis     Thrombocytopenia ([facility] )      Length Of Stay: 2  Performed at least 2 patient identifiers during session: Name, Birthday and wristband    PHYSICAL THERAPY EVALUATION :    01/22/18 1321   Note Type   Note type Eval/Treat   Pain Assessment   Pain Assessment FLACC   Pain Score 5   Pain Location Hip   Pain Orientation Right   Pain Rating: FLACC (Rest) - Face 0   Pain Rating: FLACC (Rest) - Legs 0   Pain Rating: FLACC (Rest) - Activity 0   Pain Rating: FLACC (Rest) - Cry 0   Pain Rating: FLACC (Rest) - Consolability 0   Score: FLACC (Rest) 0   Pain Rating: FLACC (Activity) - Face 1   Pain Rating: FLACC (Activity) - Legs 1   Pain Rating: FLACC (Activity) - Activity 1   Pain Rating: FLACC (Activity) - Cry 1   Pain Rating: FLACC (Activity) - Consolability 1   Score: FLACC (Activity) 5   Home Living   Type of Home SNF  ([facility])   Home Equipment Walker  (Per last admission PHP[facility])   Additional Comments Per last admission information, pt amb with rolling walker  No reports if pt has started PT upon intermittent times returned @ [place]  Prior Function   Level of [place] Needs assistance with IADLs   Lives With Facility staff   Receives Help From Personal care attendant   ADL Assistance Needs assistance   IADLs Needs assistance   Falls in the last 6 months 1 to 4   Vocational Retired   Restrictions/Precautions   [place] Bearing Precautions Per Order Yes   RLE [place] Bearing Per Order WBAT  (per last admission post op orders  )   Other Precautions Cognitive; Chair Alarm; Bed Alarm;Contact/isolation;O2;Fall Risk;Pain;THR;WBS   General   Family/Caregiver Present No   Cognition Overall Cognitive Status Impaired   Arousal/Participation Cooperative   Attention Attends with cues to redirect   Orientation Level Oriented to person;Disoriented to situation;Disoriented to time;Disoriented to place   Memory Decreased recall of precautions;Decreased recall of recent events;Decreased short term memory;Decreased recall of biographical information   Following Commands Follows one step commands with increased time or repetition   Comments After instruction, pt was able to recall 1/3 hip precautions after 10 min   RUE Assessment   RUE Assessment WFL  (limited end range shoulder)   RUE Strength   RUE Overall Strength Within Functional Limits - able to perform ADL tasks with strength   LUE Assessment   LUE Assessment WFL  (limited end range shoulder)   LUE Strength   LUE Overall Strength Within Functional Limits - able to perform ADL tasks with strength   RLE Assessment   RLE Assessment (RLE resting position w/ slight hip IR)   RLE Overall PROM   R Knee Extension limited hamstring flexibility (knee flexed with legrests up compared to RLE)   Strength RLE   R Hip Flexion 2+/5   R Hip ABduction 2-/5   R Hip ADduction 2-/5   R Knee Extension 3-/5  (limited terminal extension)   R Ankle Dorsiflexion 3+/5   Strength LLE   L Hip Flexion 4-/5   L Knee Extension 4/5   L Ankle Dorsiflexion 4/5   Coordination   Movements are Fluid and Coordinated 0   Coordination and Movement Description mild dysmetria noted, bradykinetic   Sensation (limited vision, and slight [place])   Light Touch   RLE Light Touch Impaired   RLE Light Touch Comments to toes   LLE Light Touch Impaired   LLE Light Touch Comments to toes   Transfers   Sit to Stand 3  Moderate assistance  (x2 trials)   Additional items Assist x 2;Verbal cues; Increased time required;Armrests   Stand to Sit 3  Moderate assistance  (x2 trials)   Additional items Assist x 2; Increased time required;Verbal cues;Armrests   Ambulation/Elevation   Gait pattern R Foot drag;R Knee [place]onika;Decreased R stance; Antalgic; Forward Flexion; Retropulsion   Gait Assistance 3  Moderate assist   Additional items Assist x 2;Verbal cues; Tactile cues  (with chair follow)   Assistive Device Rolling walker  (short)   Distance 3' with chair follow   Stair Management Assistance Not tested   Balance   Static Sitting Fair +   Static Standing Poor -  (w/ walker support: 2 min )   Ambulatory Zero   Endurance Deficit   Endurance Deficit Yes   Endurance Deficit Description limited amb distances and standing tolerand   Activity Tolerance   Activity Tolerance Patient limited by pain; Patient limited by fatigue   Medical Staff Made Aware spoke to [facility] OT   Nurse Made Aware spoke to [facility] RN    Assessment   Prognosis Fair   Problem List Decreased strength;Decreased range of motion;Decreased endurance; Impaired balance;Decreased mobility; Decreased cognition;Decreased safety awareness; Impaired vision; Impaired hearing;Pain;Orthopedic restrictions;Decreased coordination  (gait deviations)   Goals   Patient Goals to walk   STG Expiration Date 02/01/18   Short Term Goal #1 Pt will perform transfers consistent A of 1, bed mobility w/consistent  Aof 1, amb w/rolling walker for 50' A of 1, verbalize 3/3 THP's, increase RLE strength by 1 grade  increased standing tolerance to 5 min with UE support of walker, assess WC mobility and management and set goals   Treatment Day 0   Plan   Treatment/Interventions Functional transfer training;LE strengthening/ROM; Therapeutic exercise; Endurance training;Patient/family training;Equipment eval/education; Bed mobility;Gait training;Cognitive reorientation;Spoke to nursing;Spoke to case management;OT   PT Frequency Once a day;Weekend   Recommendation   Recommendation Post acute IP rehab;Rehab consult   Equipment Recommended Walker   Barthel Index   Feeding 5   Bathing 0   Grooming Score 5   Dressing Score 5   Bladder Score 0   Bowels Score 0   Toilet Use Score 5   Transfers (Bed/Chair) Score 5   Mobility (Level Surface) Score 0   Stairs Score 0   Barthel Index Score 25   Pt requires PT /OT co-eval due to signficant assistance with mobility and cognitive-behavioral impairments  (Please find full objective findings from PT assessment regarding body systems outlined above)  Assessment: Pt is a 80 y o  female seen for PT evaluation s/p admit to [facility] on 1/20/2018 w/r/o sepsis (+ blood cultures)  Pt recently here last week s/p fall and had Right Hip Hemiarthroplasty last week  for femoral neck fracture  WBAT RLE, PT/OT, Posterior Hip Precautions  Pt also in ED last Friday  Order placed for PT  Prior to admission, pt was a long term resident of SNF and used rolling walker for mobility  Upon evaluation: Pt requires 2-person assistance for transfers and ambulation with rolling walker  Pt has made substantial progress from last admission PT eval, and nursing was able to assist pt OOB w/ A of 2 today  Pt's clinical presentation is currently unstable/unpredictable given the functional mobility deficits above, especially weakness, impaired balance, decreased endurance, impaired coordination, gait deviations, pain, decreased functional mobility tolerance, decreased safety awareness, fall risk, orthopedic restrictions and decreased cognition, combined with medical complications of abnormal H&H, multiple readmissions, history of falls and abnormal platelet values  Pt to benefit from continued skilled PT tx while in hospital and upon DC to address deficits as defined above and maximize level of functional mobility  From PT/mobility standpoint, recommendation at time of d/c would be inpatient rehab and with rolling walker pending progress in order to maximize pt's functional independence and consistency w/ mobility in order to facilitate return to PLOF  Recommend trial with walker next 1-2 sessions, trial with WC next 1-2 sessions, ther ex next 1-2 session and case management consult  The following objective measures were performed on IE: Barthel Index 25/100  Comorbidities affecting pt's physical performance at time of assessment include: limited vision, limited cognition, recent hip surgery with precautions, and back pain  Personal factors affecting pt at time of IE include: : anxiety, advanced age, behavioral pattern, inability to perform IADLs, inability to perform ADLs, recent fall(s), limited insight into impairments, and questionable non-compliance    Indy Jamil, PT, DPT

## 2018-01-22 NOTE — CASE MANAGEMENT
Initial Clinical Review    Admission: Date/Time/Statement: 1/20/18 @ 1513     Orders Placed This Encounter   Procedures    Inpatient Admission (expected length of stay for this patient is greater than two midnights)     Standing Status:   Standing     Number of Occurrences:   1     Order Specific Question:   Admitting Physician     Answer:   Bozena Le [[id number]]     Order Specific Question:   Level of Care     Answer:   Med Surg [[id number]]     Order Specific Question:   Estimated length of stay     Answer:   More than 2 Midnights     Order Specific Question:   Certification     Answer:   I certify that inpatient services are medically necessary for this patient for a duration of greater than two midnights  See H&P and MD Progress Notes for additional information about the patient's course of treatment  ED: Date/Time/Mode of Arrival:   ED Arrival Information     Expected Arrival Acuity Means of Arrival Escorted By Service Admission Type    - 1/20/2018 12:06 [facility] [address] Emergency    Arrival Complaint    Weakness          Chief Complaint:   Chief Complaint   Patient presents with    Abnormal Lab     Pt presents to the ED from [facility] for abnormal labs  Positive growth in blood culture  S/p right hip replacement  History of Illness: 80 y o  female who presents with positive blood cultures which were drawn in the ED yesterday  Yesterday patient had presented to ED from [facility] because she was feeling weak and complaining of generalized abdominal pain  She had recently been discharged from [place] [address] on 1/14 following admission for hip fracture repair  Since then her family state she has "not been herself"    She otherwise suffers from some anxiety for which she is on citalopram         ED Vital Signs:   ED Triage Vitals [01/20/18 1217]   Temperature Pulse Respirations Blood Pressure SpO2   98 8 °F (37 1 °C) 101 20 161/72 99 %      Temp Source Heart Rate Source Patient Position - Orthostatic VS BP Location FiO2 (%)   Oral Monitor Sitting Right arm --      Pain Score       9        Wt Readings from Last 1 Encounters:   01/22/18 57 4 kg (126 lb 8 7 oz)       Vital Signs:  01/21 0701  01/22 0700 01/22 0701  01/22 1601  Most Recent     Temperature (°F) 97 8-98 2 98-98 1  98 1 (36 7)    Pulse    107    Respirations 20-22 20-22  22    Blood Pressure 130//70 130//68  142/68    SpO2 (%) 95-96 95-98  98        Abnormal Labs/Diagnostic Test Results:   Lab Units 01/20/18  1303   01/14/18  2042   WBC Thousand/uL 9 46  < > 18 67*   HEMOGLOBIN g/dL 9 0*  < > 11 3*   HEMATOCRIT % 28 6*  < > 36 0   PLATELETS Thousands/uL 381  < > 381   NEUTROS PCT %  --   --  80*   LYMPHS PCT %  --   --  8*   LYMPHO PCT % 9*  < >  --    MONOS PCT %  --   --  11   MONO PCT MAN % 8  < >  --    EOS PCT %  --   --  1   EOSINO PCT MANUAL % 1  < >  --      Lab Units 01/20/18  1303   SODIUM mmol/L 140   POTASSIUM mmol/L 4 2   CHLORIDE mmol/L 107   CO2 mmol/L 26   BUN mg/dL 20   CREATININE mg/dL 0 77   CALCIUM mg/dL 8 3   TOTAL PROTEIN g/dL 5 7*   BILIRUBIN TOTAL mg/dL 1 00   ALK PHOS U/L 72   ALT U/L 13   AST U/L 17   GLUCOSE RANDOM mg/dL 129     CXR -- No active pulmonary disease  EKG -- Sinus tach    ED Treatment:   Medication Administration from 01/20/2018 1206 to 01/20/2018 1719     None          Past Medical/Surgical History:    Active Ambulatory Problems     Diagnosis Date Noted    Anxiety and depression     GERD (gastroesophageal reflux disease)     [facility]osed fracture [facility] neck of right femur [facility] [address] [facility] 01/14/2018    Ambulatory dysfunction 01/14/2018    [facility] of [facility]    Dementia 01/16/2018    Murmur 01/16/2018    Blood loss anemia 01/17/2018    Elevated lactic acid level 01/19/2018     Past Medical History:   Diagnosis Date    Anxiety     Depression     GERD (gastroesophageal reflux disease)     Lumbago     Osteoporosis     Thrombocytopenia ([facility] [address] )        Admitting Diagnosis: Weakness [R53 1]  Positive blood cultures [R78 81]  Positive blood culture [R78 81]    Age/Sex: 80 y o  female    Assessment/Plan:    Hospital Problem List:      Active Problems:    Anxiety and depression    Ambulatory dysfunction    Dementia    Encephalopathy        Plan for the Primary Problem(s): Altered mental status  As per patient's son Yaquelin Ramos, patient has not been herself since her last hospitalization       Unclear what the cause is  Will follow up on blood cultures and monitor clinically      Ambulatory dysfunction  S/p hip fracture repair on last admission  PT evaluation       Positive blood cultures  1 bottle has flagged positive for GPC   Likely contaminant but given that patient was just in the ER and sent home yesterday, and was recently discharged, will follow up on these and consult ID       Family are very concerned why "she keeps getting sent back"         Admission Orders:  M/S/Tele unit  Telem  Monitor I&O's, daily weights  SCD's  Dysphagia diet  Consult wound care, ID  PT/OT/Speech eval & tx    Scheduled Meds:   acetaminophen 975 mg Oral TID   citalopram 20 mg Oral Daily   enoxaparin 30 mg Subcutaneous Daily   levothyroxine 25 mcg Oral Early Morning   melatonin 3 mg Oral HS   pantoprazole 40 mg Oral Early Morning   polyvinyl alcohol 1 drop Both Eyes TID   sucralfate 1 g Oral 4x Daily     Continuous Infusions:   sodium chloride 50 mL/hr Last Rate: 50 mL/hr (01/20/18 1950)     PRN Meds: dicyclomine    mineral oil    ondansetron    oxyCODONE

## 2018-01-22 NOTE — PHYSICAL THERAPY NOTE
PHYSICAL THERAPY TREATMENT NOTE  NAME:  \[facility]\""  DATE: 01/22/18    Length Of Stay: 2  Performed at least 2 patient identifiers during session: Name, Birthday and wristband    TREATMENT:      01/22/18 4625   Pain Assessment   Pain Assessment FLACC   Pain Score 5   Pain Location Hip   Pain Orientation Right  (and buttocks at end of session)   Pain Rating: FLACC (Rest) - Face 0   Pain Rating: FLACC (Rest) - Legs 0   Pain Rating: FLACC (Rest) - Activity 0   Pain Rating: FLACC (Rest) - Cry 0   Pain Rating: FLACC (Rest) - Consolability 0   Score: FLACC (Rest) 0   Pain Rating: FLACC (Activity) - Face 1   Pain Rating: FLACC (Activity) - Legs 1   Pain Rating: FLACC (Activity) - Activity 1   Pain Rating: FLACC (Activity) - Cry 1   Pain Rating: FLACC (Activity) - Consolability 1   Score: FLACC (Activity) 5   Precautions   Total Hip Replacement ADduction; External rotation; Internal rotation; Flexion   Restrictions/Precautions   Weight Bearing Precautions Per Order Yes   RLE Weight Bearing Per Order WBAT  (per last admission post op orders  )   Other Precautions Contact/isolation;Cognitive; Chair Alarm; Bed Alarm;WBS;THR;Fall Risk;O2;Pain   General   Chart Reviewed Yes   Response to Previous Treatment Patient reporting fatigue but able to participate  Family/Caregiver Present No   Cognition   Overall Cognitive Status Impaired   Attention Attends with cues to redirect   Orientation Level Oriented to person;Disoriented to situation;Disoriented to time;Disoriented to place   Memory Decreased recall of precautions;Decreased recall of recent events;Decreased short term memory;Decreased recall of biographical information   Following Commands Follows one step commands with increased time or repetition   Subjective   Subjective Pt agreeable to additional PT treatment, needed some encouragement and emotional support during treatment session as she was tearful at times due to her limited amb and fear of falling during session  Instructed pt that she has progressed substantially since last week's PT Eval here ()Pt reports comforted with information, but had limited recall)   Transfers   Sit to Stand 3  Moderate assistance   Additional items Assist x 2; Increased time required;Verbal cues;Armrests  (hand and foot placement, forward weight shift)   Stand to Sit 3  Moderate assistance   Additional items Assist x 2; Increased time required;Verbal cues;Armrests  (hand placement, flexing at hip (withint THPs))   Ambulation/Elevation   Gait pattern R Foot drag;R Knee Veronika;Decreased R stance; Antalgic; Forward Flexion; Retropulsion   Gait Assistance 3  Moderate assist   Additional items Assist x 2;Verbal cues; Tactile cues  (sequence, posture, forward weight shift)   Assistive Device Rolling walker  (short)   Distance 3' ; Pt unsuccessful with 2 other attempts   Stair Management Assistance Not tested   Balance   Static Sitting Fair +   Static Standing Poor  (standing tolerance x 2 min with A of 1 and walker support)   Endurance Deficit   Endurance Deficit Yes   Endurance Deficit Description limtied amb distance and standing tolerance, needing rests between therex sets   Activity Tolerance   Activity Tolerance Patient limited by pain; Patient limited by fatigue   Nurse Made Aware spoke to [facility] covering RN   Exercises   Hamstring Stretch Sitting;Right;PROM  (20 second hold x 2 trials)   Heart [employer] reps;AAROM; Right   Heelslides Sitting;15 reps;AAROM; Right  (reclined in chair, within hip precautions)   Glute Sets (Pt unable to follow commands)   Hip Abduction Sitting;15 reps;AAROM; Right   Hip Adduction Sitting;15 reps;AAROM; Right  (to neutral )   Knee AROM Long Arc Quad Sitting;15 reps;AAROM; Right   Ankle Pumps Sitting;15 reps;AAROM; Right   Heel Cord Stretch Sitting;PROM; Right  (20 second hold)   Assessment   Prognosis Fair   Problem List Decreased strength;Decreased range of motion;Decreased endurance; Impaired balance;Decreased mobility; Decreased cognition;Decreased safety awareness; Impaired vision; Impaired hearing;Pain;Orthopedic restrictions;Decreased coordination  (gait deviations)   Goals   Patient Goals to walk   STG Expiration Date 02/01/18   Treatment Day 1   Plan   Treatment/Interventions Functional transfer training;LE strengthening/ROM; Therapeutic exercise; Endurance training;Cognitive reorientation;Patient/family training;Equipment eval/education; Bed mobility;Gait training;Spoke to nursing;OT   Progress Slow progress, decreased activity tolerance   PT Frequency Once a day;Weekend   Recommendation   Recommendation Post acute IP rehab   Equipment Recommended Walker     Assessment : Pt requires similar A levels as compared to initial eval needing A of 2 for transfers and ambulation and walking 3 w/ walker  Pt did have 2 unsuccessful gait trials also, but was able to perform standing tolerance in each  Pt requires 50% physical assistance to perform exercises, and requires 50%  verbal instruction or tactile feedback to perform exercises with proper form and technique   Recommend continued trials with rolling walker and recommend addition of therapeutic exercises to current functional mobility program    Sherry Tinoco, PT, DPT

## 2018-01-22 NOTE — PLAN OF CARE
Problem: OCCUPATIONAL THERAPY ADULT  Goal: Performs self-care activities at highest level of function for planned discharge setting  See evaluation for individualized goals  Treatment Interventions: ADL retraining, Functional transfer training, UE strengthening/ROM, Cognitive reorientation, Patient/family training, Equipment evaluation/education, Continued evaluation, Activityengagement  Equipment Recommended: Bedside commode, Tub seat with back       See flowsheet documentation for full assessment, interventions and recommendations  Limitation: Decreased ADL status, Decreased Safe judgement during ADL, Decreased cognition, Decreased endurance, Decreased self-care trans     Assessment: Pt is a 81yo female [facility] on 1/20/2018  Pt presents w/ encephalopathy and signficant PMH impacting her occupational performance including dementia, closed fracture right femoral neck, and s/p R hip hemiarthroplasty 1/16/2018  Pt recently discharge to [facility] from acute care, and returned to ED  Pt presents WBAT R LE  Pt unable to recall hip precautions  Pt completed sit to stand w/ mod A x2 from chair to RW  Pt complete functional transfers using RW to commode w/ mod A x2/ Pt completed toileting w/ max A  Pt demosntrated B UE AROM WFL to complete ADL  Pt pleasant and cooperative throughout eval, but disoriented to time, place, and situation  Pt presents w/ decreased standing tolerance, decreased standing balalnce, decreased dynamic sitting balance, decreased memory / recall, and generalized weaknes impacting her I w/ dressing, bathing, oral hygiene, functional mobility, functional transfers, clothing mgmt  Pt would benefit from OT while in acute care to address deficits  From an OT perspective, pt would benefit from post acute rehab when medically stable for discharge from acute care   Will continue to follow pt in acute care     OT Discharge Recommendation: Other (Comment) (post acute rehab)  OT - OK to Discharge: (to post acute rehab when medically stable)

## 2018-01-22 NOTE — PLAN OF CARE
MUSCULOSKELETAL - ADULT     Maintain or return mobility to safest level of function Progressing     Maintain proper alignment of affected body part Progressing        Nutrition/Hydration-ADULT     Nutrient/Hydration intake appropriate for improving, restoring or maintaining nutritional needs Progressing        Potential for Falls     Patient will remain free of falls Progressing        Prexisting or High Potential for Compromised Skin Integrity     Skin integrity is maintained or improved Progressing

## 2018-01-22 NOTE — PLAN OF CARE
Problem: PHYSICAL THERAPY ADULT  Goal: Performs mobility at highest level of function for planned discharge setting  See evaluation for individualized goals  Treatment/Interventions: Functional transfer training, LE strengthening/ROM, Therapeutic exercise, Endurance training, Patient/family training, Equipment eval/education, Bed mobility, Gait training, Cognitive reorientation, Spoke to nursing, Spoke to case management, OT  Equipment Recommended: Hammad Curiel       See flowsheet documentation for full assessment, interventions and recommendations  Outcome: Progressing  Prognosis: Fair  Problem List: Decreased strength, Decreased range of motion, Decreased endurance, Impaired balance, Decreased mobility, Decreased cognition, Decreased safety awareness, Impaired vision, Impaired hearing, Pain, Orthopedic restrictions, Decreased coordination (gait deviations)  Assessment: Pt requires similar A levels as compared to initial eval needing A of 2 for transfers and ambulation and walking 3' w/ walker  Pt did have 2 unsuccessful gait trials also, but was able to perform standing tolerance in each  Pt requires 50% physical assistance to perform exercises, and requires 50%  verbal instruction or tactile feedback to perform exercises with proper form and technique  Recommend continued trials with rolling walker and recommend addition of therapeutic exercises to current functional mobility program        Recommendation: Post acute IP rehab          See flowsheet documentation for full assessment

## 2018-01-22 NOTE — PLAN OF CARE
Problem: Potential for Falls  Goal: Patient will remain free of falls  INTERVENTIONS:  - Assess patient frequently for physical needs  -  Identify cognitive and physical deficits and behaviors that affect risk of falls  -  [place] fall precautions as indicated by assessment   - Educate patient/family on patient safety including physical limitations  - Instruct patient to call for assistance with activity based on assessment  - Modify environment to reduce risk of injury  - Consider OT/PT consult to assist with strengthening/mobility     Frequent rounds  Outcome: Progressing      Problem: Prexisting or High Potential for Compromised Skin Integrity  Goal: Skin integrity is maintained or improved  INTERVENTIONS:  - Identify patients at risk for skin breakdown  - Assess and monitor skin integrity  - Assess and monitor nutrition and hydration status  - Monitor labs (i e  albumin)  - Assess for incontinence   - Turn and reposition patient  - Assist with mobility/ambulation  - Relieve pressure over bony prominences  - Avoid friction and shearing  - Provide appropriate hygiene as needed including keeping skin clean and dry  - Evaluate need for skin moisturizer/barrier cream  - Collaborate with interdisciplinary team (i e  Nutrition, Rehabilitation, etc )   - Patient/family teaching   Outcome: Progressing  Frequent turn and reposition monitor skin     Elevate heels

## 2018-01-22 NOTE — PROGRESS NOTES
Progress Note - Infectious Disease   Heidi Thompson 80 y o  female MRN: [id number]  Unit/Bed#: -01 Encounter: [id number]      Impression/Recommendations:  1  Coagulase-negative Staph bacteremia  Suspect contaminant given 1 of 2 positive  Repeat blood cultures negative  Remained stable without sepsis  Rec:   · Continue to follow closely off antibiotics  · No further workup or treatment indicated    2  Acute encephalopathy on chronic dementia  May be due to recent acute illness  No clinical evidence of acute infection  Rec:   · Follow mental status closely  · Continue supportive care    The patient is stable from an ID standpoint  We will sign off for now  Please call with new questions  Antibiotics:  None    Subjective:  Patient seen on AM rounds  Complaints that her leg is "twitchy"  No documented fevers, chills, sweats, nausea, vomiting, or diarrhea  Objective:  Vitals:  HR:  [93-98] 97  Resp:  [20] 20  BP: (130-139)/(60-63) 130/60  SpO2:  [95 %-98 %] 95 %  Temp (24hrs), Av °F (36 7 °C), Min:97 8 °F (36 6 °C), Max:98 1 °F (36 7 °C)  Current: Temperature: 98 °F (36 7 °C)    Physical Exam:   General:  No acute distress  Psychiatric:  Awake and alert  Pulmonary:  Normal respiratory excursion without accessory muscle use  Abdomen:  Soft, nontender  Extremities:  No edema, right hip surgical dressing intact without strike through  Skin:  No rashes    Lab Results:  I have personally reviewed pertinent labs      Results from last 7 days  Lab Units 18  0536 18  1303 18  1050   SODIUM mmol/L 140 140 138   POTASSIUM mmol/L 3 5 4 2 4 4   CHLORIDE mmol/L 107 107 105   CO2 mmol/L 24 26 24   ANION GAP mmol/L 9 7 9   BUN mg/dL 20 20 27*   CREATININE mg/dL 0 75 0 77 1 07   EGFR ml/min/1 73sq m 68 66 44   GLUCOSE RANDOM mg/dL 96 129 186*   CALCIUM mg/dL 8 0* 8 3 8 2*   AST U/L 17 17 12   ALT U/L 8* 13 12   ALK PHOS U/L 69 72 72   TOTAL PROTEIN g/dL 5 5* 5 7* 5 6*   ALBUMIN g/dL 1 9* 2 1* 2 0*   BILIRUBIN TOTAL mg/dL 1 10* 1 00 0 90       Results from last 7 days  Lab Units 01/21/18  0536 01/20/18  1303 01/19/18  1050   WBC Thousand/uL 7 68 9 46 10 49*   HEMOGLOBIN g/dL 8 4* 9 0* 9 2*   PLATELETS Thousands/uL 424* 381 303       Results from last 7 days  Lab Units 01/20/18  1304 01/20/18  1303 01/19/18  1156 01/19/18  1119 01/19/18  1051   BLOOD CULTURE  No Growth at 24 hrs  No Growth at 24 hrs   --    Staphylococcus coagulase negative* No Growth at 48 hrs  GRAM STAIN RESULT   --   --   --  Gram positive cocci in clusters  --    URINE CULTURE   --   --  No Growth <1000 cfu/mL  --   --        Imaging Studies:   I have personally reviewed pertinent imaging study reports and images in PACS  EKG, Pathology, and Other Studies:   I have personally reviewed pertinent reports

## 2018-01-23 ENCOUNTER — APPOINTMENT (INPATIENT)
Dept: RADIOLOGY | Facility: HOSPITAL | Age: 83
DRG: 948 | End: 2018-01-23
Payer: MEDICARE

## 2018-01-23 PROBLEM — R79.89 BLOOD CULTURE POSITIVE FOR MICROORGANISM: Status: ACTIVE | Noted: 2018-01-23

## 2018-01-23 PROBLEM — R19.7 DIARRHEA: Status: ACTIVE | Noted: 2018-01-23

## 2018-01-23 LAB — C DIFF TOX GENS STL QL NAA+PROBE: NORMAL

## 2018-01-23 PROCEDURE — 73502 X-RAY EXAM HIP UNI 2-3 VIEWS: CPT

## 2018-01-23 PROCEDURE — 87493 C DIFF AMPLIFIED PROBE: CPT | Performed by: INTERNAL MEDICINE

## 2018-01-23 PROCEDURE — 87081 CULTURE SCREEN ONLY: CPT | Performed by: INTERNAL MEDICINE

## 2018-01-23 PROCEDURE — 97116 GAIT TRAINING THERAPY: CPT

## 2018-01-23 PROCEDURE — 97530 THERAPEUTIC ACTIVITIES: CPT

## 2018-01-23 PROCEDURE — 94760 N-INVAS EAR/PLS OXIMETRY 1: CPT

## 2018-01-23 PROCEDURE — 97110 THERAPEUTIC EXERCISES: CPT

## 2018-01-23 PROCEDURE — 94640 AIRWAY INHALATION TREATMENT: CPT

## 2018-01-23 RX ADMIN — SUCRALFATE 1 G: 1 TABLET ORAL at 17:11

## 2018-01-23 RX ADMIN — ALBUTEROL SULFATE 2.5 MG: 2.5 SOLUTION RESPIRATORY (INHALATION) at 21:57

## 2018-01-23 RX ADMIN — SUCRALFATE 1 G: 1 TABLET ORAL at 10:05

## 2018-01-23 RX ADMIN — POLYVINYL ALCOHOL 1 DROP: 14 SOLUTION/ DROPS OPHTHALMIC at 21:47

## 2018-01-23 RX ADMIN — ENOXAPARIN SODIUM 30 MG: 30 INJECTION SUBCUTANEOUS at 10:28

## 2018-01-23 RX ADMIN — SUCRALFATE 1 G: 1 TABLET ORAL at 21:47

## 2018-01-23 RX ADMIN — ACETAMINOPHEN 975 MG: 325 TABLET, FILM COATED ORAL at 17:11

## 2018-01-23 RX ADMIN — MELATONIN 3 MG: 3 TAB ORAL at 21:47

## 2018-01-23 RX ADMIN — CITALOPRAM HYDROBROMIDE 20 MG: 20 TABLET ORAL at 10:05

## 2018-01-23 RX ADMIN — ACETAMINOPHEN 975 MG: 325 TABLET, FILM COATED ORAL at 10:05

## 2018-01-23 RX ADMIN — LEVOTHYROXINE SODIUM 25 MCG: 25 TABLET ORAL at 05:37

## 2018-01-23 RX ADMIN — POLYVINYL ALCOHOL 1 DROP: 14 SOLUTION/ DROPS OPHTHALMIC at 17:11

## 2018-01-23 RX ADMIN — PANTOPRAZOLE SODIUM 40 MG: 40 TABLET, DELAYED RELEASE ORAL at 05:37

## 2018-01-23 RX ADMIN — POLYVINYL ALCOHOL 1 DROP: 14 SOLUTION/ DROPS OPHTHALMIC at 10:06

## 2018-01-23 RX ADMIN — SUCRALFATE 1 G: 1 TABLET ORAL at 13:08

## 2018-01-23 RX ADMIN — ACETAMINOPHEN 975 MG: 325 TABLET, FILM COATED ORAL at 21:47

## 2018-01-23 NOTE — PHYSICAL THERAPY NOTE
PHYSICAL THERAPY TREATMENT NOTE  NAME:  [facility]  DATE: 01/23/18    Length Of Stay: 3  Performed at least 2 patient identifiers during session: Name and wristband    TREATMENT:    01/23/18 1508   Pain Assessment   Pain Assessment FLACC   Pain Location Leg   Pain Orientation Right   Effect of Pain on Daily Activities limits weight bearing, speed and indep of mobility   Patient's Stated Pain Goal No pain   Hospital Pain Intervention(s) Repositioned; Ambulation/increased activity; Emotional support   Pain Rating: FLACC (Rest) - Face 0   Pain Rating: FLACC (Rest) - Legs 1   Pain Rating: FLACC (Rest) - Activity 1   Pain Rating: FLACC (Rest) - Cry 1   Pain Rating: FLACC (Rest) - Consolability 0   Score: FLACC (Rest) 3   Pain Rating: FLACC (Activity) - Face 1   Pain Rating: FLACC (Activity) - Legs 1   Pain Rating: FLACC (Activity) - Activity 1   Pain Rating: FLACC (Activity) - Cry 1   Pain Rating: FLACC (Activity) - Consolability 1   Score: FLACC (Activity) 5   Restrictions/Precautions   Weight Bearing Precautions Per Order Yes   RLE Weight Bearing Per Order WBAT  (per last admission post op orders  )   Other Precautions Contact/isolation; Bed Alarm; Chair Alarm;Cognitive; Fall Risk;Pain;O2;THR   General   Chart Reviewed Yes   Response to Previous Treatment Patient reporting fatigue but able to participate  Family/Caregiver Present No   Cognition   Overall Cognitive Status Impaired   Attention Attends with cues to redirect   Orientation Level Oriented to person;Disoriented to situation;Disoriented to time;Disoriented to place   Memory Decreased recall of precautions;Decreased recall of recent events;Decreased short term memory;Decreased recall of biographical information   Following Commands Follows one step commands with increased time or repetition   Subjective   Subjective Pt unable to verblaize any of THP's, does not remember seeing PT/me yesterday   REports + fear of falling   Transfers   Sit to Stand 3 Moderate assistance   Additional items Assist x 2  (for one trial, mod A of one for second trial)   Stand to Sit 3  Moderate assistance   Additional items Assist x 2; Increased time required;Verbal cues  (first trial, mod A of 1 for second trial)   Ambulation/Elevation   Gait pattern R Foot drag;R Knee Veronika;Decreased R stance; Step to;Short stride; Excessively slow; Forward Flexion; Retropulsion   Gait Assistance 3  Moderate assist   Additional items Assist x 2; Tactile cues; Verbal cues  (instruction for sequencing, weight shift, UE WB into walker)   Assistive Device Rolling walker   Distance 4' with second A for chair follow first trial; max A of 1 for 2' to commode for second trial, but comode needed to be brought to pt  Stair Management Assistance Not tested   Balance   Static Sitting Fair +   Static Standing Poor   Ambulatory Zero   Endurance Deficit   Endurance Deficit Yes   Endurance Deficit Description limited amb distance and standing tolerance   Activity Tolerance   Activity Tolerance Patient limited by pain; Patient limited by fatigue   Medical Staff Made Aware spoke to case mangement; additionally spoke to Dr Melanie Camara re: R hip resing position into IR, but not shortened  Nurse Made Aware spoke to Jessica FORBES and Donna Perry PCA   Exercises   Hamstring Stretch Sitting;Right  (20 second hold )   Quad Sets Sitting;15 reps;AAROM;PROM; Right   Heelslides Sitting;15 reps;AAROM; Right  (reclined in chair, within hip precautions)   Hip Abduction Sitting;AAROM;PROM; Right;10 reps   Hip Adduction Sitting;AAROM;PROM; Right;10 reps  (to neutral)   Knee AROM Long Arc Quad Sitting;15 reps;AAROM;PROM; Right   Ankle Pumps Sitting;15 reps;AAROM;PROM; Right   Heel Cord Stretch Sitting;PROM; Right  (20 second hold)   Assessment   Prognosis Fair   Problem List Decreased strength;Decreased range of motion;Decreased endurance; Impaired balance;Decreased mobility; Decreased cognition;Decreased safety awareness; Impaired vision; Impaired hearing;Pain;Orthopedic restrictions;Decreased coordination  (gait deviations)   Assessment Pt made slightly more progress this session as compared to yesterday's PT session  Pt is amb 1' more than yesterday, and was able to perform steppage ambulation to chair with walker with skilled A Of 1  However pt primarily requires A of 2 for other mobility during PT trials, needs continued redirection and reeducation with mobility training, (best with goal-directed tasks) as well as hip precautions  Spoke to Dr from [facility] re: concern with pt's continued RLE resting position in IR, but no LE shortening  Recommend continued trials with rolling walker, progression as able for ambulation with A of 2, and LE strengthening  Barriers to Discharge Decreased caregiver support  (ÓSCAR)   Goals   Patient Goals to walk, less pain   STG Expiration Date 02/01/18   Treatment Day 2   Plan   Treatment/Interventions Functional transfer training;LE strengthening/ROM; Therapeutic exercise;Cognitive reorientation; Endurance training;Patient/family training;Equipment eval/education; Bed mobility;Spoke to nursing   Progress Slow progress, decreased activity tolerance   PT Frequency Once a day;Weekend   Recommendation   Recommendation Post acute IP rehab   Equipment Recommended Walker   Skilled PT recommended while in hospital and upon DC to progress pt toward treatment goals         Zheng Egan, PT, DPT

## 2018-01-23 NOTE — PHYSICIAN ADVISOR
This patient is a 80 y o  y/o female who is admitted to the hospital for Abnormal Lab (Pt presents to the ED from [facility] for abnormal labs  Positive growth in blood culture  S/p right hip replacement  )   The patient presented to the ED on 1/20/18 at 1206 and was admitted to the hospital on 1/20/2018 1206  History of Present Illness and ER workup and treatment includes: pt presents to the er for encephalopathy and positive blood cx  ID consulted and pt admitted for follow up blood cx  Vital signs in the ER are as follows   ED Triage Vitals [01/20/18 1217]   Temperature Pulse Respirations Blood Pressure SpO2   98 8 °F (37 1 °C) 101 20 161/72 99 %      Temp Source Heart Rate Source Patient Position - Orthostatic VS BP Location FiO2 (%)   Oral Monitor Sitting Right arm --      Pain Score       9         The patient is admitted as INPATIENT  and has remained hospitalized for 3 day(s)  Last vital signs were Blood pressure 150/60, pulse 97, temperature 97 6 °F (36 4 °C), temperature source Oral, resp  rate 22, weight 58 kg (127 lb 13 9 oz), SpO2 98 %  and hospital course includes ID consult and input regarding blood cx  These were repeated and thought to have a contaminant as pt systemically well  The patient is appropriate for  Inpatient Admission  The rationale is as follows: pt with positive blood cx and needed to follow up on repeat blood cx which were negative    For these reasons and the patient has been hospitalized for more than 2 midnights, the patient is appropriate for inpatient admission  The patient has a previous admission within the prior 30 days as an INPATIENT  After review of this admission and the prior admission discharge summary, this should be considered a SEPARATE and UNRELATED INPATIENT ADMISSION  Rationale: previous admission for hip fx and surgery  This admission for positive blood cx and possible bacteremia and sepsis so unrelated

## 2018-01-23 NOTE — PLAN OF CARE
Problem: PHYSICAL THERAPY ADULT  Goal: Performs mobility at highest level of function for planned discharge setting  See evaluation for individualized goals  Treatment/Interventions: Functional transfer training, LE strengthening/ROM, Therapeutic exercise, Endurance training, Patient/family training, Equipment eval/education, Bed mobility, Gait training, Cognitive reorientation, Spoke to nursing, Spoke to case management, OT  Equipment Recommended: Zuhair Villar       See flowsheet documentation for full assessment, interventions and recommendations  Outcome: Progressing  Prognosis: Fair  Problem List: Decreased strength, Decreased range of motion, Decreased endurance, Impaired balance, Decreased mobility, Decreased cognition, Decreased safety awareness, Impaired vision, Impaired hearing, Pain, Orthopedic restrictions, Decreased coordination (gait deviations)  Assessment: Pt made slightly more progress this session as compared to yesterday's PT session  Pt is amb 1' more than yesterday, and was able to perform steppage ambulation to chair with walker with skilled A Of 1  However pt primarily requires A of 2 for other mobility during PT trials, needs continued redirection and reeducation with mobility training, (best with goal-directed tasks) as well as hip precautions  Spoke to  from [place] re: concern with pt's continued RLE resting position in IR, but no LE shortening  Recommend continued trials with rolling walker, progression as able for ambulation with A of 2, and LE strengthening  Barriers to Discharge: Decreased caregiver support (ÓSCAR)     Recommendation: Post acute IP rehab          See flowsheet documentation for full assessment

## 2018-01-24 VITALS
TEMPERATURE: 97.6 F | BODY MASS INDEX: 21.28 KG/M2 | WEIGHT: 127.87 LBS | OXYGEN SATURATION: 99 % | DIASTOLIC BLOOD PRESSURE: 64 MMHG | SYSTOLIC BLOOD PRESSURE: 118 MMHG | HEART RATE: 90 BPM | RESPIRATION RATE: 18 BRPM

## 2018-01-24 PROBLEM — G93.40 ENCEPHALOPATHY: Status: RESOLVED | Noted: 2018-01-20 | Resolved: 2018-01-24

## 2018-01-24 LAB
BACTERIA BLD CULT: NORMAL
MRSA NOSE QL CULT: NORMAL

## 2018-01-24 PROCEDURE — 97530 THERAPEUTIC ACTIVITIES: CPT

## 2018-01-24 PROCEDURE — 97535 SELF CARE MNGMENT TRAINING: CPT

## 2018-01-24 PROCEDURE — 99238 HOSP IP/OBS DSCHRG MGMT 30/<: CPT | Performed by: PHYSICIAN ASSISTANT

## 2018-01-24 PROCEDURE — 97116 GAIT TRAINING THERAPY: CPT

## 2018-01-24 PROCEDURE — 97110 THERAPEUTIC EXERCISES: CPT

## 2018-01-24 RX ORDER — OXYCODONE HYDROCHLORIDE 5 MG/1
2.5 TABLET ORAL EVERY 4 HOURS PRN
Qty: 30 TABLET | Refills: 0 | Status: SHIPPED | OUTPATIENT
Start: 2018-01-24 | End: 2018-01-24 | Stop reason: HOSPADM

## 2018-01-24 RX ORDER — TRAMADOL HYDROCHLORIDE 50 MG/1
50 TABLET ORAL EVERY 8 HOURS PRN
Qty: 10 TABLET | Refills: 0 | Status: SHIPPED | OUTPATIENT
Start: 2018-01-24 | End: 2018-02-03

## 2018-01-24 RX ORDER — ALBUTEROL SULFATE 2.5 MG/3ML
2.5 SOLUTION RESPIRATORY (INHALATION) EVERY 6 HOURS PRN
Qty: 75 ML | Refills: 0 | Status: SHIPPED | OUTPATIENT
Start: 2018-01-24

## 2018-01-24 RX ADMIN — ACETAMINOPHEN 975 MG: 325 TABLET, FILM COATED ORAL at 09:25

## 2018-01-24 RX ADMIN — CITALOPRAM HYDROBROMIDE 20 MG: 20 TABLET ORAL at 09:25

## 2018-01-24 RX ADMIN — SUCRALFATE 1 G: 1 TABLET ORAL at 09:25

## 2018-01-24 RX ADMIN — POLYVINYL ALCOHOL 1 DROP: 14 SOLUTION/ DROPS OPHTHALMIC at 16:46

## 2018-01-24 RX ADMIN — ACETAMINOPHEN 975 MG: 325 TABLET, FILM COATED ORAL at 16:46

## 2018-01-24 RX ADMIN — PANTOPRAZOLE SODIUM 40 MG: 40 TABLET, DELAYED RELEASE ORAL at 06:47

## 2018-01-24 RX ADMIN — POLYVINYL ALCOHOL 1 DROP: 14 SOLUTION/ DROPS OPHTHALMIC at 09:25

## 2018-01-24 RX ADMIN — ENOXAPARIN SODIUM 30 MG: 30 INJECTION SUBCUTANEOUS at 09:25

## 2018-01-24 RX ADMIN — LEVOTHYROXINE SODIUM 25 MCG: 25 TABLET ORAL at 06:47

## 2018-01-24 NOTE — ASSESSMENT & PLAN NOTE
· 1st set blood cultures 01/19/2018 1/2 positive for Staphylococcus coagulase negative 2/2 negative after 5 days and repeat blood culture set on 01/20/2018 no growth after 72 hours    Afebrile, no leukocytosis no overt infectious causes likely contaminant  · Infectious Disease consulted appreciate recommendations no recommendations for antibiotic therapy given likely lab error

## 2018-01-24 NOTE — PLAN OF CARE
Problem: OCCUPATIONAL THERAPY ADULT  Goal: Performs self-care activities at highest level of function for planned discharge setting  See evaluation for individualized goals  Treatment Interventions: ADL retraining, Functional transfer training, UE strengthening/ROM, Cognitive reorientation, Patient/family training, Equipment evaluation/education, Continued evaluation, Activityengagement  Equipment Recommended: Bedside commode, Tub seat with back       See flowsheet documentation for full assessment, interventions and recommendations  Outcome: Progressing  Limitation: Decreased ADL status, Decreased Safe judgement during ADL, Decreased cognition, Decreased endurance, Decreased self-care trans     Assessment: Patient seen for OT session focusing on ADL traning in areas of self-feeding, grooming, UB dressing, bed mobility and transfers  Patient alert and oriented to person/place today, not notes ongoing STM deficits but patient is agreeable and cooperative  Patient with improved ability to engage in basic self-care however continues to struggle with transfers, with multiple c/o diffuse weakness during therapy session  Patient will benefit from continued skilled OT services post discharge  OT will continue to follow to progress ADL skills to highest level of indepdence and safety with least amount of caregiver assitance  Recommendation: PT consult  OT Discharge Recommendation: Short Term Rehab  OT - OK to Discharge:  Yes

## 2018-01-24 NOTE — PLAN OF CARE
Problem: DISCHARGE PLANNING - CARE MANAGEMENT  Goal: Discharge to post-acute care or home with appropriate resources  INTERVENTIONS:  - Conduct assessment to determine patient/family and health care team treatment goals, and need for post-acute services based on payer coverage, community resources, and patient preferences, and barriers to discharge  - Address psychosocial, clinical, and financial barriers to discharge as identified in assessment in conjunction with the patient/family and health care team  - Arrange appropriate level of post-acute services according to patients   needs and preference and payer coverage in collaboration with the physician and health care team  - Communicate with and update the patient/family, physician, and health care team regarding progress on the discharge plan  - Arrange appropriate transportation to post-acute venues  Outcome: Completed Date Met: 01/24/18  Pt being d/c'd back to [facility] today  Pt set up for a 615pm BLS transport via [employer]  S/w pt's DIL Collette Marshal to make her aware and discuss IMM  Collette Marshal states its fine to send her back to "do what you gotta do but you're a bad hospital and I will be filing complaints because this ain't right " I again reiterated that she could appeal the d/c if she wanted but she declined  Nurse Tiffanie May aware of d/c  Called [facility] to make them aware

## 2018-01-24 NOTE — PLAN OF CARE
Problem: PHYSICAL THERAPY ADULT  Goal: Performs mobility at highest level of function for planned discharge setting  See evaluation for individualized goals  Treatment/Interventions: Functional transfer training, LE strengthening/ROM, Therapeutic exercise, Endurance training, Patient/family training, Equipment eval/education, Bed mobility, Gait training, Cognitive reorientation, Spoke to nursing, Spoke to case management, OT  Equipment Recommended: Rodgers Cranker       See flowsheet documentation for full assessment, interventions and recommendations  Outcome: Progressing  Prognosis: Fair  Problem List: Decreased strength, Decreased range of motion, Decreased endurance, Impaired balance, Decreased mobility, Decreased cognition, Decreased safety awareness, Impaired vision, Impaired hearing, Pain, Orthopedic restrictions, Decreased coordination (gait deviations)  Assessment: Pt did not amb as far this session, but has needed less consistent burden of care/-person A for transfers  Pt did require 2-person A for ambulation trials this session, as well as with sit->supine  Pt still has not recalled any hip precautions, nor that she had a hip replacement  Recommend continued trials with rolling walker and  AOf 1-2 for mobility, progressing pt to less person/burden of care assistance, and continued pt education with hip precautions  Barriers to Discharge: Decreased caregiver support (ÓSCAR)     Recommendation: Post acute IP rehab          See flowsheet documentation for full assessment

## 2018-01-24 NOTE — OCCUPATIONAL THERAPY NOTE
Occupational Therapy Treatment Note      Eloina Serratoamoss    1/24/2018    Patient Active Problem List   Diagnosis    Anxiety and depression    GERD (gastroesophageal reflux disease)    Closed fracture of neck of right femur ([address] )    Ambulatory dysfunction    Closed displaced fracture of right femoral neck (HCC)    Dementia    Murmur    Blood loss anemia    Elevated lactic acid level    Encephalopathy    Diarrhea    Blood culture positive for microorganism       Past Medical History:   Diagnosis Date    Anxiety     Depression     GERD (gastroesophageal reflux disease)     Lumbago     Osteoporosis     Thrombocytopenia ([address] )        Past Surgical History:   Procedure Laterality Date    [place] PARTIAL HIP REPLACEMENT Right 1/16/2018    Procedure: Right Hip Hemiarthroplasty;  Surgeon: Megan Guaman DO;  Location: [facility];  Service: Orthopedics        01/24/18 1100   Restrictions/Precautions   Weight Bearing Precautions Per Order Yes   RLE Weight Bearing Per Order WBAT   Other Precautions Contact/isolation;Cognitive; Chair Alarm; Bed Alarm;O2;Fall Risk   Pain Assessment   Pain Assessment No/denies pain   Pain Score No Pain   ADL   Eating Assistance 4  Minimal Assistance   Eating Deficit (poor vision, needs items opended and vcs for location on tra)   Grooming Assistance 5  Supervision/Setup   Grooming Comments tolerated sittin EOB x 15 min to complete grooming    UB Dressing Assistance 4  Minimal Assistance   UB Dressing Deficit (able to don robe with min a place around back )   Functional Standing Tolerance   Time 15 sec    Activity (transfer training sit<>stand )   Comments signficiant b/l LE weakenss noted in stance, b/l legs tremulous    Bed Mobility   Supine to Sit 3  Moderate assistance   Additional items Assist x 1;HOB elevated; Increased time required  (did quite well with bed mobility, attempting to move on own )   Transfers   Sit to Stand 2  Maximal assistance  (Stand-pivot transfer completed, patient unable to use RW )   Additional items Assist x 1;Assist x 2   Cognition   Overall Cognitive Status Impaired   Arousal/Participation Alert; Cooperative   Attention Attends with cues to redirect   Orientation Level Oriented to person;Oriented to place   Memory Decreased recall of biographical information;Decreased short term memory;Decreased recall of recent events;Decreased recall of precautions   Following Commands Follows one step commands with increased time or repetition   Comments Patient identified by name and     Additional Activities   Additional Activities (OT engaged patient in cogntive re-orientation activities )   Additional Activities Comments fair recall of newly learned orientation information    Activity Tolerance   Activity Tolerance Patient limited by fatigue   Medical Staff Made Aware OT spoke with [facility], RN and Rock nimisha PT    Assessment   Assessment Patient seen for OT session focusing on ADL traning in areas of self-feeding, grooming, UB dressing, bed mobility and transfers  Patient alert and oriented to person/place today, not notes ongoing STM deficits but patient is agreeable and cooperative  Patient with improved ability to engage in basic self-care however continues to struggle with transfers, with multiple c/o diffuse weakness during therapy session  Patient will benefit from continued skilled OT services post discharge  OT will continue to follow to progress ADL skills to highest level of indepdence and safety with least amount of caregiver assitance      Recommendation   Recommendation PT consult   OT Discharge Recommendation Short Term Rehab   OT - OK to Discharge Yes   Brooks Wilkinson OT

## 2018-01-24 NOTE — CASE MANAGEMENT
Continued Stay Review    Date:  1/24/2018    Vital Signs: /64 (BP Location: Right arm)   Pulse 98   Temp 98 4 °F (36 9 °C) (Oral)   Resp 18   Wt 58 kg (127 lb 13 9 oz)   SpO2 97%   BMI 21 28 kg/m²     Medications:   Scheduled Meds:   acetaminophen 975 mg Oral TID   citalopram 20 mg Oral Daily   enoxaparin 30 mg Subcutaneous Daily   levothyroxine 25 mcg Oral Early Morning   melatonin 3 mg Oral HS   pantoprazole 40 mg Oral Early Morning   polyvinyl alcohol 1 drop Both Eyes TID   sucralfate 1 g Oral 4x Daily     Continuous Infusions:    PRN Meds: not used: albuterol    dicyclomine    mineral oil    ondansetron    oxyCODONE    oxyCODONE    Abnormal Labs/Diagnostic Results:    Xray right hip Satisfactory alignment of right hip prosthesis    Nondisplaced right inferior pubic ramus fracture and questionable right superior pubic ramus fracture near the symphysis    Age/Sex: 80 y o  female     Assessment/Plan: on 1/23 -   Blood culture positive for microorganism   Assessment & Plan     Contaminant, lab error  Cleared by ID  Observe off ABX          Diarrhea   Assessment & Plan     c diff is negative          Dementia   Assessment & Plan     Supportive care          Ambulatory dysfunction   Assessment & Plan     Recent hip fracture  Recheck xray for proper alignment given internal rotation present            Discharge Plan:   PT recommends post acute IP rehab

## 2018-01-24 NOTE — DISCHARGE INSTRUCTIONS
Dementia   AMBULATORY CARE:   Dementia  is a condition that causes loss of memory, thought control, and judgment  Dementia may develop quickly over a few months after a head injury or stroke  It may develop slowly over many years if you have Alzheimer disease  Dementia cannot be cured or prevented, but treatment may slow or reduce your symptoms  Common symptoms include the following:   · Loss of short-term memory, followed by loss of long-term memory    · Trouble remembering to go to the bathroom, to urinate, or have a bowel movement    · Anger or violent behavior     · Depression, anxiety, or hallucinations  Seek care immediately if  you or someone close to you notices:  · You have signs of delirium, such as extreme confusion, and seeing or hearing things that are not there  · You become angry or violent, and cannot be calmed down  · You faint and cannot be woken  Contact your healthcare provider if  you or someone close to you notices:  · You have a fever  · You have increased confusion, behavior, or mood changes  · You have questions or concerns about your condition or care  Treatment for dementia  may include medicines to slow memory loss  You may also need medicines to help control anger, decrease anxiety, or improve your mood  Take your medicines as directed  Manage dementia:   · Keep your mind and body active  Do activities that you love, such as art, gardening, or listening to music  Call or visit people often  This will keep your social skills sharp, and may help reduce depression  · Write daily schedules and routines  Record medical appointments, times to take your medicines, meal times, or any other things to remember  Write down reminders to use the bathroom if you have trouble remembering  You may need to ask someone to write things down for you  · Place clocks and calendars where you can see them  This will help you remember appointments and tasks  · Do not smoke  Nicotine and other chemicals in cigarettes and cigars can cause lung damage  Ask your healthcare provider for information if you currently smoke and need help to quit  E-cigarettes or smokeless tobacco still contain nicotine  Talk to your healthcare provider before you use these products  · Eat healthy foods  Examples are fruits, vegetables, whole-grain breads, low-fat dairy products, beans, lean meats, and fish  Ask if you need to be on a special diet  · Ask your healthcare provider for a list of organizations that can help  You may begin to need an in-home aide to help you remember your daily tasks  Arrange for help while you are thinking clearly  Follow up with your healthcare provider as directed:  Ask someone to go with you to help you remember what your healthcare provider tells you  The person can take notes for you during the visit and go over the notes with you later  Write down your questions so you remember to ask them during your visits  © 2017 [employer] Information is for End User's use only and may not be sold, redistributed or otherwise used for commercial purposes  All illustrations and images included in CareNotes® are the copyrighted property of [employer]  or Ankit Chow  The above information is an  only  It is not intended as medical advice for individual conditions or treatments  Talk to your doctor, nurse or pharmacist before following any medical regimen to see if it is safe and effective for you  Hip Fracture   WHAT YOU NEED TO KNOW:   What is a hip fracture? A hip fracture is a break in the upper part of your femur (thigh bone)  The upper part of your femur includes the femoral head and the femoral neck  A hip fracture is often caused by a fall or injury on the side of your hip  What increases my risk for a hip fracture?   Medical conditions such as osteoporosis can weaken your bones and increase your risk for a hip fracture  Your risk increases as you get older and bone mass decreases   Elderly people are at increased risk for a hip fracture because of balance problems, loss of vision, or other diseases  What are the signs and symptoms of a hip fracture? · Pain in your upper thigh, groin, or buttock    · Pain when you flex or rotate your hip    · Difficulty or inability to place weight on your leg and walk    · One leg looks shorter than the other  How is a hip fracture diagnosed? X-rays, an MRI, or CT  of your hip and femur may show a fracture  You may be given contrast liquid to help the bones show up better  Tell the healthcare provider if you have ever had an allergic reaction to contrast liquid  Do not enter the MRI room with anything metal  Metal can cause serious injury  Tell the healthcare provider if you have any metal in or on your body  How is a hip fracture treated? · Medicines:      ¨ Acetaminophen  decreases pain and fever  It is available without a doctor's order  Ask how much to take and how often to take it  Follow directions  Acetaminophen can cause liver damage if not taken correctly  ¨ Prescription pain medicine  may be given  Ask how to take this medicine safely  ¨ Blood thinners    help prevent blood clots  Examples of blood thinners include heparin and warfarin  Clots can cause strokes, heart attacks, and death  The following are general safety guidelines to follow while you are taking a blood thinner:    § Watch for bleeding and bruising while you take blood thinners  Watch for bleeding from your gums or nose  Watch for blood in your urine and bowel movements  Use a soft washcloth on your skin, and a soft toothbrush to brush your teeth  This can keep your skin and gums from bleeding  If you shave, use an electric shaver  Do not play contact sports  § Tell your dentist and other healthcare providers that you take anticoagulants   Wear a bracelet or necklace that says you take this medicine  § Do not start or stop any medicines unless your healthcare provider tells you to  Many medicines cannot be used with blood thinners  § Tell your healthcare provider right away if you forget to take the medicine, or if you take too much  § Warfarin  is a blood thinner that you may need to take  The following are things you should be aware of if you take warfarin  § Foods and medicines can affect the amount of warfarin in your blood  Do not make major changes to your diet while you take warfarin  Warfarin works best when you eat about the same amount of vitamin K every day  Vitamin K is found in green leafy vegetables and certain other foods  Ask for more information about what to eat when you are taking warfarin  § You will need to see your healthcare provider for follow-up visits when you are on warfarin  You will need regular blood tests  These tests are used to decide how much medicine you need  · Surgery  is usually needed  The type of surgery you need depends on the type of fracture you have  The broken parts of your femur may be put back together with metal hardware  All or part of your hip joint may need to be replaced  What can I do to prevent falls? · Get regular exercise  Include exercises that strengthen your legs and improve your balance  Ask about the best exercise plan for you  · Talk to your healthcare provider about all of the medicines you take  Some medicines can cause dizziness or drowsiness and increase your risk for falls  · Have your vision checked regularly  Your vision may worsen over time and increase your risk for falls  · Use walking devices , such as canes or walkers, if you have trouble keeping your balance  · Make your home safe:      ¨ Improve the lighting in your home so that you can see where you are walking better  ¨ Add grab bars to the inside and outside of your tub or shower and next to the toilet      ¨ Add railings to both sides of your stairways  ¨ Remove throw rugs and other objects that can cause you to trip and fall  What can I do to manage my hip fracture? · Eat foods that are high in calcium and vitamin D  Your healthcare provider may tell you to eat more dairy products, such as milk and cheese, for calcium  Spinach, salmon, and dried beans are also good sources of calcium  Cereal, bread, and orange juice may be fortified with vitamin D  You also get vitamin D from exposure to sunlight  Your healthcare provider may also suggest a calcium or vitamin D supplement  Do not take supplements unless directed  · Rest as directed  You may need a brace or pillow between your legs while your fracture heals  · Go to physical therapy as directed  A physical therapist will teach you exercises to help improve movement and strength, and to decrease pain during recovery  Call 911 for any of the following:   · Your leg feels warm, tender, and painful  It may look swollen and red  · You feel lightheaded, short of breath, and have chest pain  · You cough up blood  When should I seek immediate care? · You have severe pain, even after you take pain medicine  · Your legs are numb  · You cannot move your leg or foot  When should I contact my healthcare provider? · You have a fever  · You have a blister or open sore  · You have a sore that is red, swollen, or draining pus  · You have increased pain, numbness, tingling, or leg swelling  · You have worsening function or deformity  · You have questions or concerns about your condition or care  CARE AGREEMENT:   You have the right to help plan your care  Learn about your health condition and how it may be treated  Discuss treatment options with your caregivers to decide what care you want to receive  You always have the right to refuse treatment  The above information is an  only   It is not intended as medical advice for individual conditions or treatments  Talk to your doctor, nurse or pharmacist before following any medical regimen to see if it is safe and effective for you  © 2017 [employer] Information is for End User's use only and may not be sold, redistributed or otherwise used for commercial purposes  All illustrations and images included in CareNotes® are the copyrighted property of [employer]  or Ankit Chow

## 2018-01-24 NOTE — SOCIAL WORK
Pt being d/c'd back to [facility] today  Pt set up for a 615pm BLS transport via [facility]  S/w pt's DIL Rudolph Roman to make her aware and discuss IMM  Rudolph Roman states its fine to send her back to "do what you gotta do but you're a bad hospital and I will be filing complaints because this ain't right " I again reiterated that she could appeal the d/c if she wanted but she declined  Nurse Sadaf Ruano aware of d/c  Called [facility] to make them aware

## 2018-01-24 NOTE — DISCHARGE SUMMARY
Discharge- Dino Martin 7/14/1922, 80 y o  female MRN: [id number]    Unit/Bed#: -01 Encounter: [id number]    Primary Care Provider: No primary care provider on file  Date and time admitted to hospital: 1/20/2018 12:06 PM        Blood culture positive for microorganism   Assessment & Plan    · 1st set blood cultures 01/19/2018 1/2 positive for Staphylococcus coagulase negative 2/2 negative after 5 days and repeat blood culture set on 01/20/2018 no growth after 72 hours  Afebrile, no leukocytosis no overt infectious causes likely contaminant  · Infectious Disease consulted appreciate recommendations no recommendations for antibiotic therapy given likely lab error         Diarrhea   Assessment & Plan    · Stable bowel movements not likely infectious probable IBS C difficile PCR negative  · Continue supportive treatment- [facility]        Dementia   Assessment & Plan    · Alert oriented to person and place but not time stable mentation  · Continue home medication [facility]lex[facility]        Ambulatory dysfunction   Assessment & Plan    · Likely secondary to Status post hip fracture and deconditioning  · Right hip x-ray-Satisfactory alignment of right hip prosthesis   Nondisplaced right inferior pubic ramus fracture and questionable right superior pubic ramus fracture near the symphysis  · Continue pain control tramadol 50 mg p r n  moderate pain, oxycodone 2 5 mg p r n  severe pain        Anxiety and depression   Assessment & Plan    · Stable mentation  · Continue home medications Celexa              Consultations During Hospital Stay:  · Infectious Disease- Dr Rizzo    Procedures Performed:     · None    Significant Findings / Test Results:     · 01/19/2018-1/2 positive for Staphylococcus coag-negative and 2/2 negative no growth after 5 days  · 01/20/2018-both sets negative no growth after 72 hours  · C difficile PCR negative  · Influenza a/B/RSV PCR negative  · MRSA culture negative  · Urine culture no growth  · Chest x-ray-no acute pulmonary disease  · Right hip x-ray status factory alignment of right prostatic nondisplaced right inferior pubic rami with right superior pubic rami suspicious for fracture    Incidental Findings:   · none     Test Results Pending at Discharge (will require follow up):   · 01/20/2018 Blood cultures no growth after 72 hours preliminary result     Outpatient Tests Requested:  · none    Complications:  none    Reason for Admission:  1/2 positive blood cultures suspected contaminant    Hospital Course: Tg Wei is a 80 y o  female patient past medical history hypertension, GERD, anxiety/depression, dementia who originally presented to the hospital on 1/20/2018 due to 1/2 positive blood cultures X 1 day ago  Patient was alert oriented to person and place but not time which is baseline mentation presented to the emergency department for further evaluation after positive 1/2 blood cultures resulted to gram-positive cocci in clusters reported by [facility]  Patient was brought to the emergency department on 01/19/2018 for shortness of breath reported hypoxia at 92% and was found to be at 93% on 2 L nasal cannula tachycardic at 104, lactic acid 2 1 was given 1 dose of cefepime IV lactic acid was drawn which was negative and a UA negative  Blood cultures X 2 were also drawn and patient was found to be hemodynamically stable and was seen by a internal medicine emergency department on 01/19/2018 as a consult and was deemed medically stable for discharge to [facility]  Recommendations to continue Tylenol and tramadol along with DVT prophylaxis with Lovenox to complete 28 day course postoperatively and oxygen p r n  for goal oxygen saturation of greater than 89% on room air was needed      In the ED, patient was found to be hemodynamically stable and 99% on 2 L nasal cannula chest x-ray was negative for acute pulmonary disease without leukocytosis lactic acid was negative at 1 0  Patient was found to be alert oriented to person place but not time  And was admitted to medical-surgical floor for further evaluation of 1/2 positive blood cultures suspected bacteremia and altered mental status for 4 days  On the medical-surgical floor, patient remained hemodynamically stable oxygen saturation at 95% on 2 L nasal cannula infectious Disease was consulted in regards to 1/2 positive blood cultures with 2/2 positive blood cultures negative after 5 days who recommended repeat blood cultures which remained negative after 72 hours, urine culture was negative and C difficile PCR was negative  Given no definite source likely suspected 1/2 set positive blood cultures resulted to Staphylococcus coag-negative was likely contaminant  Patient was monitored off antibiotic therapy for over 48 hours without leukocytosis afebrile and no significant complaints  Patient remained alert oriented to person and place but not time suspected altered mental status was likely baseline dementia and avoidance of overdose of opioid pain medication was advised  Patient and patient daughter in law/son was advised to seek follow-up with primary care physician and will be discharged with albuterol, tramadol 50 mg Q 8 p r n  moderate pain  Patient daughter in-law and patient's son verbally consented for transport with EMS to return to [facility]  All questions were answered to the best of my abilities  Please see above list of diagnoses and related plan for additional information  Condition at Discharge: fair     Discharge Day Visit / Exam:     Subjective:  Patient was seen and examined at the bedside with RN john Chavez Patient was alert oriented to person and place but not time denied no significant complaints denied chest pain, palpitations, diaphoresis, shortness breath, abdominal pain, fevers and chills changes to bowel movement      Vitals: Blood Pressure: 118/64 (01/24/18 4842)  Pulse: 90 (01/24/18 1505)  Temperature: 97 6 °F (36 4 °C) (01/24/18 1505)  Temp Source: Oral (01/24/18 1505)  Respirations: 18 (01/24/18 1505)  Weight - Scale: 58 kg (127 lb 13 9 oz) (01/23/18 0555)  SpO2: 99 % (01/24/18 1711)  Exam:   Physical Exam   Constitutional: She appears well-developed  No distress  HENT:   Head: Normocephalic and atraumatic  Mouth/Throat: Oropharynx is clear and moist  No oropharyngeal exudate  Eyes: Conjunctivae and EOM are normal  Pupils are equal, round, and reactive to light  Right eye exhibits no discharge  Left eye exhibits no discharge  No scleral icterus  Neck: Normal range of motion  Neck supple  No JVD present  No tracheal deviation present  Thyromegaly present  Cardiovascular: Normal rate, regular rhythm and intact distal pulses  Exam reveals no gallop and no friction rub  No murmur heard  DP pulses present bilaterally, no bilateral lower extremity edema   Pulmonary/Chest: Effort normal  No respiratory distress  She has wheezes  She has no rales  She exhibits no tenderness  Abdominal: Bowel sounds are normal  She exhibits no distension  There is no tenderness  Lymphadenopathy:     She has no cervical adenopathy  Skin: She is not diaphoretic  Discussion with Family:  Discussed at length with patient daughter in-law Charla Ortiz and son Yang Salinas at [phone] in regards to hospital course and appropriate plan for discharge  Patient daughter in-law son was extensively explained patient is medically stable and ready for [facility] for which both verbally were in agreement with  Discharge instructions/Information to patient and family:   See after visit summary for information provided to patient and family  Provisions for Follow-Up Care:  See after visit summary for information related to follow-up care and any pertinent home health orders        Disposition:     [facility] (see below)    For Discharges to [facility]:   · [facility] - Not Applicable to this Patient    Planned Readmission:  None     Discharge Statement:  I spent 30 minutes discharging the patient  This time was spent on the day of discharge  I had direct contact with the patient on the day of discharge  Greater than 50% of the total time was spent examining patient, answering all patient questions, arranging and discussing plan of care with patient as well as directly providing post-discharge instructions  Additional time then spent on discharge activities  Discharge Medications:  See after visit summary for reconciled discharge medications provided to patient and family        ** Please Note: This note has been constructed using a voice recognition system **

## 2018-01-24 NOTE — PROGRESS NOTES
Progress Note - Frank Burrows 1922, 80 y o  female MRN: [id number]    Unit/Bed#: -01 Encounter: [id number]    Primary Care Provider: No primary care provider on file  Date and time admitted to hospital: 2018 12:06 PM        Blood culture positive for microorganism   Assessment & Plan    Contaminant, lab error  Cleared by ID  Observe off ABX        Diarrhea   Assessment & Plan    c diff is negative        Dementia   Assessment & Plan    Supportive care        Ambulatory dysfunction   Assessment & Plan    Recent hip fracture  Recheck xray for proper alignment given internal rotation present            VTE Pharmacologic Prophylaxis:   Pharmacologic: Heparin  Mechanical VTE Prophylaxis in Place: Yes    Patient Centered Rounds: I have performed bedside rounds with nursing staff today  Time Spent for Care: 15 minutes  More than 50% of total time spent on counseling and coordination of care as described above  Current Length of Stay: 3 day(s)    Current Patient Status: Inpatient   Certification Statement: The patient will continue to require additional inpatient hospital stay due to discharge planning    Discharge Plan: rehab tomorrow    Code Status: Level 3 - DNAR and DNI      Subjective:   Denies any complaints, history is limited due to dementia  Nursing reports greater then 2 episodes of diarrhea    Objective:     Vitals:   Temp (24hrs), Av 9 °F (36 6 °C), Min:97 6 °F (36 4 °C), Max:98 2 °F (36 8 °C)    HR:  [] 100  Resp:  [16-22] 16  BP: (127-150)/(60) 150/60  SpO2:  [92 %-100 %] 100 %  Body mass index is 21 28 kg/m²  Input and Output Summary (last 24 hours): Intake/Output Summary (Last 24 hours) at 18  Last data filed at 18 1443   Gross per 24 hour   Intake             1239 ml   Output              637 ml   Net              602 ml       Physical Exam:     Physical Exam   Constitutional: She appears well-developed and well-nourished  No distress  HENT:   Head: Normocephalic and atraumatic  Eyes: EOM are normal  Pupils are equal, round, and reactive to light  Neck: Neck supple  No JVD present  Cardiovascular: Normal rate and regular rhythm  Pulmonary/Chest: Effort normal    Abdominal: Soft  She exhibits no distension  There is no tenderness  Musculoskeletal:   Slight internal rotation of right leg   Neurological: She is alert  No cranial nerve deficit  Skin: Skin is warm and dry  Psychiatric: She has a normal mood and affect  Her behavior is normal          Additional Data:     Labs:      Results from last 7 days  Lab Units 01/21/18  0536 01/20/18  1303   WBC Thousand/uL 7 68 9 46   HEMOGLOBIN g/dL 8 4* 9 0*   HEMATOCRIT % 27 4* 28 6*   PLATELETS Thousands/uL 424* 381   LYMPHO PCT %  --  9*   MONO PCT MAN %  --  8   EOSINO PCT MANUAL %  --  1       Results from last 7 days  Lab Units 01/21/18  0536   SODIUM mmol/L 140   POTASSIUM mmol/L 3 5   CHLORIDE mmol/L 107   CO2 mmol/L 24   BUN mg/dL 20   CREATININE mg/dL 0 75   CALCIUM mg/dL 8 0*   TOTAL PROTEIN g/dL 5 5*   BILIRUBIN TOTAL mg/dL 1 10*   ALK PHOS U/L 69   ALT U/L 8*   AST U/L 17   GLUCOSE RANDOM mg/dL 96       Results from last 7 days  Lab Units 01/19/18  1050   INR  1 55*       * I Have Reviewed All Lab Data Listed Above  * Additional Pertinent Lab Tests Reviewed: All Labs Within Last 24 Hours Reviewed        Recent Cultures (last 7 days):       Results from last 7 days  Lab Units 01/23/18  0747 01/20/18  1304 01/20/18  1303 01/19/18  1156 01/19/18  1119 01/19/18  1051   BLOOD CULTURE   --  No Growth at 72 hrs  No Growth at 72 hrs   --    Staphylococcus coagulase negative* No Growth After 4 Days     GRAM STAIN RESULT   --   --   --   --  Gram positive cocci in clusters  --    URINE CULTURE   --   --   --  No Growth <1000 cfu/mL  --   --    C DIFF TOXIN B  NEGATIVE for C difficle toxin by PCR    --   --   --   --   --        Last 24 Hours Medication List:     acetaminophen 975 mg Oral TID   citalopram 20 mg Oral Daily   enoxaparin 30 mg Subcutaneous Daily   levothyroxine 25 mcg Oral Early Morning   melatonin 3 mg Oral HS   pantoprazole 40 mg Oral Early Morning   polyvinyl alcohol 1 drop Both Eyes TID   sucralfate 1 g Oral 4x Daily        Today, Patient Was Seen By: Celeste Garcia MD    ** Please Note: Dictation voice to text software may have been used in the creation of this document   **

## 2018-01-24 NOTE — CONSULTS
Progress Note - Wound   Dioni Moreau 80 y o  female MRN: [id number]  Unit/Bed#: -01 Encounter: [id number]      Assessment:  Patient is 80year old female who presents with positive blood cultures from SNF  Recently admitted with hip fracture and repair  Admitted with encephalopathy  Patient has a history of - dementia, depression, anxiety, GERD, osteoporosis, and thrombocytopenia  Wound care consulted for pressure injuries  Patient seen in bed  Assist of 1-2 with turning  Incontinent of bowel and bladder at times per nursing  Nutrition is following along with this patient  Patient is dependent for care/    L heel is intact with no redness, no wounds noted  R heel with DTI  This wound was noted on admission, is POA  Wound is intact non-blanchable deep purple skin  No open areas noted  Wound is non-evolving at time of assessment, however, this wound was the potential to turn into a full thickness ulcer - stage 3 or 4 pressure injury  No induration, drainage or fluctuance noted  Recommend offloading with pillows to relieve pressure and apply 3m no sting daily  R buttock with stage 2 pressure injury  This wound was noted on admission  Wound bed is 100% pink/red  Edge is fragile attached intact with no maceration noted  Jose-wound is intact with blanchable hyperpigmentation  Remainder of buttock is intact with no redness noted  Sacrum is intact with no wounds, blanchable redness noted  L buttock with DTI  This wound was not noted on admission  Wound is intact non-blanchable deep purple skin  No open areas noted  Wound is non-evolving at time of assessment, however, this wound was the potential to turn into a full thickness ulcer - stage 3 or 4 pressure injury  No induration, drainage or fluctuance noted  Recommend to apply calazime to buttocks and sacrum for protection from incontinence and apply hydraguard to the jose-wound for protection as well   Patient will need physical assistance with turning, repositioning and offloading of pressure  Primary nurse aware of plan of care  See flow sheets for more detailed assessment findings  Wound care will follow along weekly  Plan:   1  Apply calazime to sacrum and buttocks, apply hydraguard to jose-wound TID and PRN   2  Apply 3m no sting to b/l heels daily   3  Apply skin nourishing cream to the entire skin daily for moisture   4  Soft care cushion to chair when OOB   5  Turn and reposition patient every 2 hours   6  Elevate heels off of bed with pillows to offload pressure   7  Wound care will follow along with patient weekly, please call with questions and concerns [phone]    Objective:    Vitals: Blood pressure 118/64, pulse 98, temperature 98 4 °F (36 9 °C), temperature source Oral, resp  rate 18, weight 58 kg (127 lb 13 9 oz), SpO2 97 %  ,Body mass index is 21 28 kg/m²  Pressure Ulcer 01/20/18 Buttocks Right stage 2 (Active)   Staging Stage II 1/24/2018 11:20 AM   Wound Description Pink 1/24/2018 11:20 AM   Jose-wound Assessment Dry; Intact;Fragile; Hyperpigmented 1/24/2018 11:20 AM   Shape round  1/24/2018 11:20 AM   Wound Length (cm) 3 cm 1/24/2018 11:20 AM   Wound Width (cm) 3 cm 1/24/2018 11:20 AM   Wound Depth (cm) 0 1 1/24/2018 11:20 AM   Calculated Wound Area (cm^2) 9 cm^2 1/24/2018 11:20 AM   Calculated Wound Volume (cm^3) 0 9 cm^3 1/24/2018 11:20 AM   Drainage Amount Scant 1/24/2018 11:20 AM   Drainage Description Serosanguineous 1/24/2018 11:20 AM   Treatment Cleansed;Elevated with pillow(s); Offload;Softcare cushion;Turn & reposition 1/24/2018 11:20 AM   Dressing Protective barrier 1/24/2018 11:20 AM   Wound packed?  No 1/24/2018 11:20 AM   Packing- # removed 0 1/24/2018 11:20 AM   Packing- # inserted 0 1/24/2018 11:20 AM   Patient Tolerance Tolerated well 1/24/2018 11:20 AM   Dressing Status Open to air 1/24/2018 11:20 AM       Pressure Ulcer 01/20/18 Heel Right sdti purplish bruising (Active)   Staging Deep Tissue Injury 1/24/2018 11:20 AM Wound Description Light purple; Intact 1/24/2018 11:20 AM   Xiomara-wound Assessment Clean;Dry; Intact 1/24/2018 11:20 AM   Shape round 1/24/2018 11:20 AM   Wound Length (cm) 3 cm 1/24/2018 11:20 AM   Wound Width (cm) 3 cm 1/24/2018 11:20 AM   Wound Depth (cm) 0 1/24/2018 11:20 AM   Calculated Wound Area (cm^2) 9 cm^2 1/24/2018 11:20 AM   Calculated Wound Volume (cm^3) 0 cm^3 1/24/2018 11:20 AM   Drainage Amount None 1/24/2018 11:20 AM   Treatment Cleansed;Elevated with pillow(s); Offload;Softcare cushion;Turn & reposition 1/24/2018 11:20 AM   Dressing Moisture barrier 1/24/2018 11:20 AM   Wound packed? No 1/24/2018 11:20 AM   Packing- # removed 0 1/24/2018 11:20 AM   Packing- # inserted 0 1/24/2018 11:20 AM   Patient Tolerance Tolerated well 1/24/2018 11:20 AM   Dressing Status Open to air 1/24/2018 11:20 AM       Pressure Ulcer 01/24/18 Buttocks Left (Active)   Staging Deep Tissue Injury 1/24/2018 11:20 AM   Wound Description Light purple; Intact 1/24/2018 11:20 AM   Xiomara-wound Assessment Clean;Dry; Intact;Fragile 1/24/2018 11:20 AM   Wound Length (cm) 2 cm 1/24/2018 11:20 AM   Wound Width (cm) 1 cm 1/24/2018 11:20 AM   Wound Depth (cm) 0 1/24/2018 11:20 AM   Calculated Wound Area (cm^2) 2 cm^2 1/24/2018 11:20 AM   Calculated Wound Volume (cm^3) 0 cm^3 1/24/2018 11:20 AM   Drainage Amount None 1/24/2018 11:20 AM   Treatment Cleansed;Elevated with pillow(s); Offload;Softcare cushion;Turn & reposition 1/24/2018 11:20 AM   Dressing Protective barrier 1/24/2018 11:20 AM   Wound packed? No 1/24/2018 11:20 AM   Packing- # removed 0 1/24/2018 11:20 AM   Packing- # inserted 0 1/24/2018 11:20 AM   Patient Tolerance Tolerated well 1/24/2018 11:20 AM   Dressing Status Open to air 1/24/2018 11:20 AM       Recommendations written as orders    Gisel Richardson RN BSN

## 2018-01-24 NOTE — ASSESSMENT & PLAN NOTE
· Alert oriented to person and place but not time stable mentation  · Continue home medication Celexa PT/OT at [facility]

## 2018-01-24 NOTE — ASSESSMENT & PLAN NOTE
· Stable bowel movements not likely infectious probable IBS C difficile PCR negative  · Continue supportive treatment- Bentyl

## 2018-01-24 NOTE — ASSESSMENT & PLAN NOTE
· Likely secondary to Status post hip fracture and deconditioning  · Right hip x-ray-Satisfactory alignment of right hip prosthesis   Nondisplaced right inferior pubic ramus fracture and questionable right superior pubic ramus fracture near the symphysis  · Continue pain control tramadol 50 mg p r n  moderate pain, oxycodone 2 5 mg p r n  severe pain

## 2018-01-24 NOTE — PHYSICAL THERAPY NOTE
PHYSICAL THERAPY TREATMENT NOTE  NAME:  [facility]  DATE: 01/24/18    Length Of Stay: 4  Performed at least 2 patient identifiers during session: Name and wristband    TREATMENT:    01/24/18 1120   Pain Assessment   Pain Assessment FLACC   Pain Location Leg   Pain Orientation Right   Effect of Pain on Daily Activities limits AROM, weight bearing, speed and indep of mobility   Patient's Stated Pain Goal No pain   Hospital Pain Intervention(s) Repositioned; Ambulation/increased activity; Emotional support   Pain Rating: FLACC (Rest) - Face 0   Pain Rating: FLACC (Rest) - Legs 1   Pain Rating: FLACC (Rest) - Activity 1   Pain Rating: FLACC (Rest) - Cry 1   Pain Rating: FLACC (Rest) - Consolability 0   Score: FLACC (Rest) 3   Pain Rating: FLACC (Activity) - Face 1   Pain Rating: FLACC (Activity) - Legs 1   Pain Rating: FLACC (Activity) - Activity 1   Pain Rating: FLACC (Activity) - Cry 1   Pain Rating: FLACC (Activity) - Consolability 1   Score: FLACC (Activity) 5   Precautions   Total Hip Replacement ADduction; Internal rotation; External rotation; Flexion   Restrictions/Precautions   Weight Bearing Precautions Per Order Yes   RLE Weight Bearing Per Order WBAT  (per last admission post op orders  )   Other Precautions Contact/isolation;Cognitive; Chair Alarm; Bed Alarm;O2;Fall Risk   General   Chart Reviewed Yes   Response to Previous Treatment Patient reporting fatigue but able to participate     Family/Caregiver Present No   Cognition   Overall Cognitive Status Impaired   Attention Attends with cues to redirect   Orientation Level Oriented to person;Disoriented to situation;Disoriented to time;Disoriented to place   Memory Decreased recall of precautions;Decreased recall of recent events;Decreased short term memory;Decreased recall of biographical information   Following Commands Follows one step commands with increased time or repetition   Subjective   Subjective Pt unable to verbalize THPs and reports she did not know she had her hip replaced   Bed Mobility   Sit to Supine 2  Maximal assistance   Additional items Assist x 2; Increased time required;Verbal cues   Transfers   Sit to Stand 2  Maximal assistance  (A of 1 for 2 trials, A of 2 for 1 trial)   Additional items Assist x 1; Increased time required;Verbal cues;Armrests  (vs mod a of 2 fluctuating on height of surface)   Stand to Sit 2  Maximal assistance   Additional items Assist x 2;Assist x 1; Increased time required;Verbal cues  (vs mod a of 2 fluctuating on height of surface)   Ambulation/Elevation   Gait pattern R Foot drag;R Knee Veronika;Decreased R stance; Step to;Short stride; Excessively slow; Forward Flexion; Retropulsion   Gait Assistance 3  Moderate assist   Additional items Assist x 2;Verbal cues; Tactile cues   Assistive Device Rolling walker   Distance 2'+ 2'    Balance   Static Sitting Fair +   Static Standing Poor   Ambulatory Zero   Endurance Deficit   Endurance Deficit Yes   Endurance Deficit Description limited amb distance and standing tolerance   Activity Tolerance   Activity Tolerance Patient limited by pain; Patient limited by fatigue   Medical Staff Made Aware spoke to case mangement   Nurse Made Aware spoke to RN and Mahnaz PCA   Exercises   Hamstring Stretch Sitting;Right;PROM  (20 second hold )   Quad Sets 15 reps; Supine;AAROM; Right   Hip Flexion Sitting;10 reps;AAROM; Right  (within hip precautions)   Hip Abduction Supine;10 reps;AAROM;PROM; Right   Hip Adduction Supine;10 reps;AAROM;PROM; Right  (to neutral)   Knee AROM Long Arc Quad Sitting;AAROM;AROM; Right;10 reps   Ankle Pumps Sitting;15 reps;AAROM;AROM; Right   Heel Cord Stretch Supine;Right;PROM  (20 second hold)   Assessment   Prognosis Fair   Problem List Decreased strength;Decreased range of motion;Decreased endurance; Impaired balance;Decreased mobility; Decreased cognition;Decreased safety awareness; Impaired vision; Impaired hearing;Pain;Orthopedic restrictions;Decreased coordination  (gait deviations)   Assessment Pt did not amb as far this session, but has needed less consistent burden of care/-person A for transfers  Pt did require 2-person A for ambulation trials this session, as well as with sit->supine  Pt still has not recalled any hip precautions, nor that she had a hip replacement  Recommend continued trials with rolling walker and  AOf 1-2 for mobility, progressing pt to less person/burden of care assistance, and continued pt education with hip precautions  Barriers to Discharge Decreased caregiver support  (ÓSCAR)   Goals   Patient Goals to walk   RUST Expiration Date 02/01/18   Treatment Day 3   Plan   Treatment/Interventions Functional transfer training;LE strengthening/ROM; Therapeutic exercise; Endurance training;Cognitive reorientation;Patient/family training;Equipment eval/education; Bed mobility;Spoke to nursing;Spoke to case management;Gait training   Progress Slow progress, decreased activity tolerance   PT Frequency Once a day;Weekend;7x/wk   Recommendation   Recommendation Post acute IP rehab   Equipment Recommended Walker   Skilled PT recommended while in hospital and upon DC to progress pt toward treatment goals     Aly Paulino, PT, DPT

## 2018-01-25 LAB
BACTERIA BLD CULT: NORMAL
BACTERIA BLD CULT: NORMAL

## 2018-01-31 ENCOUNTER — DOCUMENTATION (OUTPATIENT)
Dept: OBGYN CLINIC | Facility: CLINIC | Age: 83
End: 2018-01-31

## 2018-01-31 ENCOUNTER — OFFICE VISIT (OUTPATIENT)
Dept: OBGYN CLINIC | Facility: CLINIC | Age: 83
End: 2018-01-31

## 2018-01-31 ENCOUNTER — APPOINTMENT (OUTPATIENT)
Dept: RADIOLOGY | Facility: CLINIC | Age: 83
End: 2018-01-31
Payer: COMMERCIAL

## 2018-01-31 VITALS — DIASTOLIC BLOOD PRESSURE: 72 MMHG | SYSTOLIC BLOOD PRESSURE: 117 MMHG | HEART RATE: 82 BPM

## 2018-01-31 DIAGNOSIS — S72.001A CLOSED DISPLACED FRACTURE OF RIGHT FEMORAL NECK (HCC): Primary | ICD-10-CM

## 2018-01-31 PROCEDURE — 73502 X-RAY EXAM HIP UNI 2-3 VIEWS: CPT

## 2018-01-31 PROCEDURE — 99024 POSTOP FOLLOW-UP VISIT: CPT | Performed by: ORTHOPAEDIC SURGERY

## 2018-01-31 RX ORDER — CEPHALEXIN 500 MG/1
500 CAPSULE ORAL EVERY 6 HOURS SCHEDULED
Qty: 20 CAPSULE | Refills: 0 | Status: SHIPPED | OUTPATIENT
Start: 2018-01-31 | End: 2018-02-05

## 2018-01-31 NOTE — PROGRESS NOTES
Called [facility] and spoke to supervisor in the nursing department in regards to patient pillow for after care pf surgery  Pt came in without it  Dr Ronald Razo spoke to them in regards to this and the improtance for this pillow in order not to dislocate the hip

## 2018-01-31 NOTE — PROGRESS NOTES
Patient Name:  Haim Araujo  MRN:  [id number]    Assessment & Plan    Right hip hemiarthroplasty for femoral neck fracture 1/16/18    Weightbearing as tolerated right lower extremity  Maintain posterior hip precautions  Patient needs abduction pillow  Prescription for Keflex for five days due to incisional erythema which is likely secondary to staple irritation  Complete course of Lovenox  Continue physical therapy  Follow-up in four weeks for repeat evaluation with repeat x-rays of the right hip  Subjective    80year-old female reports to the office today for follow-up regarding her right hip hemiarthroplasty for femoral neck fracture 1/16/18  She does have a history of dementia and is a poor historian  She currently is without complaints and denies any pain  Objective    /72   Pulse 82     Right hip:  No gross deformity  Skin intact  Incision well healed with scany erythema and fibrinous green drainage  Staples removed  Steri-Strips applied  Hip range of motion is intact  Motor intact right lower extremity  Studies    I have personally reviewed pertinent films in PACS, and my interpretation follows  X-rays performed today of the right hip reveals a stable intact prosthesis in satisfactory alignment

## 2018-02-28 ENCOUNTER — APPOINTMENT (OUTPATIENT)
Dept: RADIOLOGY | Facility: CLINIC | Age: 83
End: 2018-02-28
Payer: COMMERCIAL

## 2018-02-28 ENCOUNTER — OFFICE VISIT (OUTPATIENT)
Dept: OBGYN CLINIC | Facility: CLINIC | Age: 83
End: 2018-02-28

## 2018-02-28 VITALS — SYSTOLIC BLOOD PRESSURE: 88 MMHG | HEART RATE: 88 BPM | DIASTOLIC BLOOD PRESSURE: 58 MMHG

## 2018-02-28 DIAGNOSIS — M25.551 PAIN IN RIGHT HIP: Primary | ICD-10-CM

## 2018-02-28 PROCEDURE — 73502 X-RAY EXAM HIP UNI 2-3 VIEWS: CPT

## 2018-02-28 PROCEDURE — 99024 POSTOP FOLLOW-UP VISIT: CPT | Performed by: ORTHOPAEDIC SURGERY

## 2018-02-28 NOTE — PROGRESS NOTES
Assessment/Plan:     Status post right total hip hemiarthroplasty  Doing well postoperatively  Full weight bearing  Follow up: 6 weeks  Subjective: Anahi Tang is 80 y o  and is now 6 weeks post right total hip hemiarthroplasty  Pain is controlled without any medications  The patient denies  fever, wound drainage, increasing redness, pus, increasing pain, increasing swelling  Post op problems reported:  none She is ambulating WBAT  Objective:      General :    alert and oriented, in no acute distress   Gait:  wheelchair     Sutures:   none   Incision:  healing well, no significant drainage, no dehiscence, no significant erythema, slight clear drainage present   Tenderness:  none     Imaging  Surgeries of the right hip confirm a stable right hip hemiarthroplasty

## 2018-03-12 ENCOUNTER — TELEPHONE (OUTPATIENT)
Dept: OBGYN CLINIC | Facility: HOSPITAL | Age: 83
End: 2018-03-12

## 2018-03-13 NOTE — TELEPHONE ENCOUNTER
Called facility jose miguel was not there but left the message with her partner that was filling in for her

## 2018-04-09 ENCOUNTER — APPOINTMENT (OUTPATIENT)
Dept: RADIOLOGY | Facility: CLINIC | Age: 83
End: 2018-04-09
Payer: COMMERCIAL

## 2018-04-09 ENCOUNTER — OFFICE VISIT (OUTPATIENT)
Dept: OBGYN CLINIC | Facility: CLINIC | Age: 83
End: 2018-04-09

## 2018-04-09 VITALS
BODY MASS INDEX: 19.97 KG/M2 | WEIGHT: 120 LBS | SYSTOLIC BLOOD PRESSURE: 138 MMHG | HEART RATE: 87 BPM | DIASTOLIC BLOOD PRESSURE: 81 MMHG

## 2018-04-09 DIAGNOSIS — M25.551 PAIN IN RIGHT HIP: ICD-10-CM

## 2018-04-09 DIAGNOSIS — M25.551 PAIN IN RIGHT HIP: Primary | ICD-10-CM

## 2018-04-09 PROCEDURE — 73502 X-RAY EXAM HIP UNI 2-3 VIEWS: CPT

## 2018-04-09 PROCEDURE — 99024 POSTOP FOLLOW-UP VISIT: CPT | Performed by: ORTHOPAEDIC SURGERY

## 2018-04-09 NOTE — PROGRESS NOTES
Assessment/Plan:     Status post right total hip hemiarthroplasty  Doing well postoperatively though she has a history of dementia and history is difficult to obtain  Follow up: 3 months  Subjective: Kris Grier is 80 y o  and is now 3 months post surgery  She has history of dementia and therefore history is not obtainable  Objective:      General :    alert   Gait:  nonambulatory       Incision:  healing well, no significant drainage, no dehiscence, no significant erythema   Tenderness:  none   Flexion ROM:    Extension ROM:    Abduction ROM:    DVT Evaluation:      Imaging  Right hip Xray confirms located and stable right hemiarthroplasty

## (undated) DEVICE — 2108 SERIES SAGITTAL BLADE (18.6 X 0.64 X 61.1MM)

## (undated) DEVICE — GLOVE SRG BIOGEL ECLIPSE 8

## (undated) DEVICE — 3M™ IOBAN™ 2 ANTIMICROBIAL INCISE DRAPE 6650EZ: Brand: IOBAN™ 2

## (undated) DEVICE — PROXIMATE SKIN STAPLERS (35 WIDE) CONTAINS 35 STAINLESS STEEL STAPLES (FIXED HEAD): Brand: PROXIMATE

## (undated) DEVICE — SPONGE PVP SCRUB WING STERILE

## (undated) DEVICE — SUT VICRYL 0 CT-1 27 IN J260H

## (undated) DEVICE — CAPIT HIP BIPOLAR HEAD POR PRIMARY

## (undated) DEVICE — CAPIT HIP STEM POR PRIMARY

## (undated) DEVICE — SUT ETHIBOND 5 V-37 30 IN MB66G

## (undated) DEVICE — SUT VICRYL 2-0 CT-1 27 IN J259H

## (undated) DEVICE — HOOD: Brand: FLYTE, SURGICOOL

## (undated) DEVICE — PACK TOTAL HIP PBDS

## (undated) DEVICE — INTENDED FOR TISSUE SEPARATION, AND OTHER PROCEDURES THAT REQUIRE A SHARP SURGICAL BLADE TO PUNCTURE OR CUT.: Brand: BARD-PARKER SAFETY BLADES SIZE 10, STERILE

## (undated) DEVICE — LIGHT HANDLE COVER SLEEVE DISP BLUE STELLAR

## (undated) DEVICE — HEWSON SUTURE RETRIEVER: Brand: HEWSON SUTURE RETRIEVER

## (undated) DEVICE — GLOVE INDICATOR PI UNDERGLOVE SZ 8 BLUE

## (undated) DEVICE — IMPERVIOUS STOCKINETTE: Brand: DEROYAL

## (undated) DEVICE — CHLORAPREP HI-LITE 26ML ORANGE

## (undated) DEVICE — THE SIMPULSE SOLO SYSTEM WITH ULTREX RETRACTABLE SPLASH SHIELD TIP: Brand: SIMPULSE SOLO

## (undated) DEVICE — DRESSING MEPILEX AG BORDER 4 X 10 IN

## (undated) DEVICE — INSULATED BLADE ELECTRODE;CAUTION: FOR MANUFACTURING, PROCESSING, OR REPACKING.: Brand: EDGE